# Patient Record
Sex: FEMALE | Race: WHITE | NOT HISPANIC OR LATINO | URBAN - METROPOLITAN AREA
[De-identification: names, ages, dates, MRNs, and addresses within clinical notes are randomized per-mention and may not be internally consistent; named-entity substitution may affect disease eponyms.]

---

## 2022-09-16 ENCOUNTER — APPOINTMENT (OUTPATIENT)
Dept: PREADMISSION TESTING | Facility: HOSPITAL | Age: 72
End: 2022-09-16
Attending: ORTHOPAEDIC SURGERY
Payer: MEDICARE

## 2022-09-16 ENCOUNTER — TRANSCRIBE ORDERS (OUTPATIENT)
Dept: PREADMISSION TESTING | Facility: HOSPITAL | Age: 72
End: 2022-09-16

## 2022-09-16 VITALS
TEMPERATURE: 97.7 F | WEIGHT: 144 LBS | SYSTOLIC BLOOD PRESSURE: 120 MMHG | RESPIRATION RATE: 12 BRPM | BODY MASS INDEX: 24.59 KG/M2 | HEART RATE: 64 BPM | OXYGEN SATURATION: 98 % | HEIGHT: 64 IN | DIASTOLIC BLOOD PRESSURE: 72 MMHG

## 2022-09-16 DIAGNOSIS — M19.012 PRIMARY OSTEOARTHRITIS, LEFT SHOULDER: ICD-10-CM

## 2022-09-16 DIAGNOSIS — Z01.818 ENCOUNTER FOR OTHER PREPROCEDURAL EXAMINATION: ICD-10-CM

## 2022-09-16 DIAGNOSIS — M19.012 PRIMARY OSTEOARTHRITIS, LEFT SHOULDER: Primary | ICD-10-CM

## 2022-09-16 DIAGNOSIS — Z20.822 CONTACT WITH AND (SUSPECTED) EXPOSURE TO COVID-19: ICD-10-CM

## 2022-09-16 DIAGNOSIS — Z01.818 ENCOUNTER FOR OTHER PREPROCEDURAL EXAMINATION: Primary | ICD-10-CM

## 2022-09-16 PROBLEM — Z86.2 HISTORY OF BLOOD CLOTTING DISORDER: Status: ACTIVE | Noted: 2022-09-16

## 2022-09-16 PROBLEM — Z86.73 HX OF TRANSIENT ISCHEMIC ATTACK (TIA): Status: ACTIVE | Noted: 2022-09-16

## 2022-09-16 PROBLEM — I10 HYPERTENSION: Status: ACTIVE | Noted: 2022-09-16

## 2022-09-16 LAB — SARS-COV-2 RNA RESP QL NAA+PROBE: NEGATIVE

## 2022-09-16 PROCEDURE — 99203 OFFICE O/P NEW LOW 30 MIN: CPT | Performed by: HOSPITALIST

## 2022-09-16 PROCEDURE — U0003 INFECTIOUS AGENT DETECTION BY NUCLEIC ACID (DNA OR RNA); SEVERE ACUTE RESPIRATORY SYNDROME CORONAVIRUS 2 (SARS-COV-2) (CORONAVIRUS DISEASE [COVID-19]), AMPLIFIED PROBE TECHNIQUE, MAKING USE OF HIGH THROUGHPUT TECHNOLOGIES AS DESCRIBED BY CMS-2020-01-R: HCPCS

## 2022-09-16 RX ORDER — IRBESARTAN 150 MG/1
150 TABLET ORAL NIGHTLY
COMMUNITY

## 2022-09-16 RX ORDER — PANTOPRAZOLE SODIUM 40 MG/1
40 TABLET, DELAYED RELEASE ORAL DAILY
COMMUNITY

## 2022-09-16 RX ORDER — ATORVASTATIN CALCIUM 40 MG/1
40 TABLET, FILM COATED ORAL NIGHTLY
COMMUNITY

## 2022-09-16 RX ORDER — DILTIAZEM HYDROCHLORIDE 120 MG/1
120 CAPSULE, COATED, EXTENDED RELEASE ORAL DAILY
COMMUNITY

## 2022-09-16 RX ORDER — ASPIRIN 81 MG/1
81 TABLET ORAL DAILY
Status: ON HOLD | COMMUNITY
End: 2022-09-22 | Stop reason: SDUPTHER

## 2022-09-16 RX ORDER — LORAZEPAM 0.5 MG/1
0.5 TABLET ORAL EVERY 6 HOURS PRN
COMMUNITY

## 2022-09-16 RX ORDER — ESCITALOPRAM OXALATE 20 MG/1
20 TABLET ORAL DAILY
COMMUNITY

## 2022-09-16 RX ORDER — EZETIMIBE 10 MG/1
10 TABLET ORAL NIGHTLY
COMMUNITY

## 2022-09-16 ASSESSMENT — PAIN SCALES - GENERAL: PAINLEVEL: 2

## 2022-09-16 NOTE — H&P (VIEW-ONLY)
[facility] Medicine Service -  Pre-Operative Consultation       Patient Name: Radha Chiang  Referring Surgeon: Dr. Mello Paez    Reason for Referral: Pre-Operative Evaluation  Surgical Procedure: Left Anatomic Total Shoulder Arthroplasty Versus Reverse Total Shoulder Arthroplasty  Operative Date: 09/21/2022  Other Providers:      PCP: Gemran Mi MD          HISTORY OF PRESENT ILLNESS      Radha Chiang is a 72 y.o. female presenting today to the [facility] at [facility] for pre-operative evaluation prior to planned surgery.    Complains of progressive left shoulder pain.  No relief with conservative measures and reports that her pain now constant, impacting her day-to-day activities so opting for surgery.    In regards to medical history:      The patient denies any current or recent chest pain or pressure, dyspnea, cough, sputum, fevers, chills, abdominal pain, nausea, vomiting, diarrhea or other symptoms. No urinary symptoms, no BRBPR or melena    Functionally, the patient is able to ascend a flight or so of stairs with no dyspnea or chest pain.     The patient denies, on specific questioning, the following:  No history of MI, or CHF. Hx of SVT  No history of LUZ.  No history of DVT/PE. Hx of activated protein C resistance, prothrombin gene mutation  No history of COPD.  history of CVA.  No history of DM.   No history of CKD.     PAST MEDICAL AND SURGICAL HISTORY      Past Medical History:   Diagnosis Date    Activated protein C resistance (CMS/HCC)     Anxiety     Arthritis     Back pain     COVID-19 05/31/2022    symptoms cough sore throat and fever    COVID-19 vaccine series completed     2 shots and 2 boosters    Depression     GERD (gastroesophageal reflux disease)     History of loop recorder     still in since 2021    History of Mohs surgery for squamous cell carcinoma of skin     on head    Hypertension     Lipid disorder      Mini stroke      was confused brought to ER carlkeri nothing found follwed up with neuro with no issues    Prothrombin gene mutation (CMS/HCC)     Shoulder pain     SVT (supraventricular tachycardia) (CMS/HCC)        Past Surgical History:   Procedure Laterality Date     SECTION      times 2    COLONOSCOPY      DIAGNOSTIC LAPAROSCOPY      KNEE SURGERY Left     menicus    SALIVARY GLAND SURGERY Left     cancer 2019    SHOULDER SURGERY Bilateral     right labrum    TONSILLECTOMY      UPPER GASTROINTESTINAL ENDOSCOPY      WISDOM TOOTH EXTRACTION         MEDICATIONS        Current Outpatient Medications:     aspirin 81 mg enteric coated tablet, Take 81 mg by mouth daily. Sept 15, 2022, Disp: , Rfl:     atorvastatin (LIPITOR) 40 mg tablet, Take 40 mg by mouth nightly., Disp: , Rfl:     dilTIAZem CD (CARDIZEM CD) 120 mg 24 hr capsule, Take 120 mg by mouth daily., Disp: , Rfl:     escitalopram (LEXAPRO) 20 mg tablet, Take 20 mg by mouth daily., Disp: , Rfl:     ezetimibe (ZETIA) 10 mg tablet, Take 10 mg by mouth nightly., Disp: , Rfl:     irbesartan (AVAPRO) 150 mg tablet, Take 150 mg by mouth nightly., Disp: , Rfl:     LORazepam (ATIVAN) 0.5 mg tablet, Take 0.5 mg by mouth every 6 (six) hours as needed for anxiety., Disp: , Rfl:     pantoprazole (PROTONIX) 40 mg EC tablet, Take 40 mg by mouth daily., Disp: , Rfl:     rivaroxaban (XARELTO) 20 mg tablet, Take 10 mg by mouth daily with dinner. Before flying   And for 4 weeks, Disp: , Rfl:     ALLERGIES      Cat dander, House dust, Mold, and Other    FAMILY HISTORY      family history is not on file.    Denies any prior known family history of DVTs/PEs/clotting disorder    SOCIAL HISTORY      Social History     Tobacco Use    Smoking status: Never Smoker    Smokeless tobacco: Never Used   Vaping Use    Vaping Use: Never used   Substance Use Topics    Alcohol use: Yes     Comment: social    Drug use: Yes     Types: Marijuana      "Comment: brady at times       REVIEW OF SYSTEMS      All other systems reviewed and negative except as noted in HPI    PHYSICAL EXAMINATION      Visit Vitals  /72 (BP Location: Right upper arm, Patient Position: Sitting)   Pulse 64   Temp 36.5 °C (97.7 °F)   Resp 12   Ht 1.626 m (5' 4\")   Wt 65.3 kg (144 lb)   SpO2 98%   BMI 24.72 kg/m²     Body mass index is 24.72 kg/m².    Physical Exam  Constitutional:       Appearance: Normal appearance.   HENT:      Head: Normocephalic and atraumatic.   Eyes:      Conjunctiva/sclera: Conjunctivae normal.   Cardiovascular:      Rate and Rhythm: Normal rate and regular rhythm.      Heart sounds: Normal heart sounds.   Pulmonary:      Effort: Pulmonary effort is normal.      Breath sounds: Normal breath sounds.   Abdominal:      General: Bowel sounds are normal. There is no distension.      Palpations: Abdomen is soft.      Tenderness: There is no abdominal tenderness.   Musculoskeletal:      Cervical back: Neck supple.      Comments: Left shoulder limited ROM from pain   Skin:     General: Skin is warm and dry.   Neurological:      Mental Status: She is alert and oriented to person, place, and time.   Psychiatric:         Mood and Affect: Mood normal.         Behavior: Behavior normal.         LABS / EKG        Labs  Outside labs reviewed: CBC, CMP - within acceptable limits    No results found for: NA, K, CL, BUN, CREATININE, WBC, HGB, HCT, PLT, ALT, AST, INR, HGBA1C      ECG/Telemetry  I have independently reviewed the ECG. No significant findings.    ASSESSMENT AND PLAN         Encounter for other preprocedural examination  Reports good exercise capacity  History of TGA, asymptomatic coronary atherosclerosis per cardiology records.  Currently patient exhibits no signs/symptoms of angina, arrythmia or decompenstated CHF  Seen by her cardiologist for preop cardiovascular evaluation and per Dr. Walsh \" low cardiovascular risk for surgery.\"  Preop labs within acceptable " limits.    P:  Can proceed to surgery without additional testing.    History of blood clotting disorder  No personal history of DVT/PE  Testing positive for activated protein C resistance, prothrombin gene mutation  Seen by hematology and plan for Xarelto postop for DVT prophylaxis.  Recommend early ambulation and SCDs postop.    Hypertension  Blood pressure appears to be adequately controlled  Continue diltiazem and Avapro.  Patient instructed to hold her Avapro the morning of surgery.  Monitor blood pressures postop    Hx of transient ischemic attack (TIA)  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  On aspirin, statin  Aspirin to be held for surgery       In regards to perioperative cardiac risk:  Regarding perioperative cardiovascular risk:  Defer preoperative cardiovascular risk assessment and recommendations for further testing to the patient's cardiologist.      Further comments:  Resume supplements when OK with surgical team.  I would encourage incentive spirometry to assist with minimizing anna-operative pulmonary risk.  DVT prophylaxis and timing of such per the discretion of the surgeon.     Please do not hesitate to contact [facility] during the upcoming hospitalization with any questions or concerns.     Ivan Easley MD  9/16/2022

## 2022-09-16 NOTE — ASSESSMENT & PLAN NOTE
Blood pressure appears to be adequately controlled  Continue diltiazem and Avapro.  Patient instructed to hold her Avapro the morning of surgery.  Monitor blood pressures postop

## 2022-09-16 NOTE — CONSULTS
[facility] Medicine Service -  Pre-Operative Consultation       Patient Name: Radha Chiang  Referring Surgeon: Dr. Mello Paez    Reason for Referral: Pre-Operative Evaluation  Surgical Procedure: Left Anatomic Total Shoulder Arthroplasty Versus Reverse Total Shoulder Arthroplasty  Operative Date: 09/21/2022  Other Providers:      PCP: German Mi MD          HISTORY OF PRESENT ILLNESS      Radha Chiang is a 72 y.o. female presenting today to the [facility] at [facility] for pre-operative evaluation prior to planned surgery.    Complains of progressive left shoulder pain.  No relief with conservative measures and reports that her pain now constant, impacting her day-to-day activities so opting for surgery.    In regards to medical history:      The patient denies any current or recent chest pain or pressure, dyspnea, cough, sputum, fevers, chills, abdominal pain, nausea, vomiting, diarrhea or other symptoms. No urinary symptoms, no BRBPR or melena    Functionally, the patient is able to ascend a flight or so of stairs with no dyspnea or chest pain.     The patient denies, on specific questioning, the following:  No history of MI, or CHF. Hx of SVT  No history of LUZ.  No history of DVT/PE. Hx of activated protein C resistance, prothrombin gene mutation  No history of COPD.  history of CVA.  No history of DM.   No history of CKD.     PAST MEDICAL AND SURGICAL HISTORY      Past Medical History:   Diagnosis Date    Activated protein C resistance (CMS/HCC)     Anxiety     Arthritis     Back pain     COVID-19 05/31/2022    symptoms cough sore throat and fever    COVID-19 vaccine series completed     2 shots and 2 boosters    Depression     GERD (gastroesophageal reflux disease)     History of loop recorder     still in since 2021    History of Mohs surgery for squamous cell carcinoma of skin     on head    Hypertension     Lipid disorder      Mini stroke      was confused brought to ER carlkeri nothing found follwed up with neuro with no issues    Prothrombin gene mutation (CMS/HCC)     Shoulder pain     SVT (supraventricular tachycardia) (CMS/HCC)        Past Surgical History:   Procedure Laterality Date     SECTION      times 2    COLONOSCOPY      DIAGNOSTIC LAPAROSCOPY      KNEE SURGERY Left     menicus    SALIVARY GLAND SURGERY Left     cancer 2019    SHOULDER SURGERY Bilateral     right labrum    TONSILLECTOMY      UPPER GASTROINTESTINAL ENDOSCOPY      WISDOM TOOTH EXTRACTION         MEDICATIONS        Current Outpatient Medications:     aspirin 81 mg enteric coated tablet, Take 81 mg by mouth daily. Sept 15, 2022, Disp: , Rfl:     atorvastatin (LIPITOR) 40 mg tablet, Take 40 mg by mouth nightly., Disp: , Rfl:     dilTIAZem CD (CARDIZEM CD) 120 mg 24 hr capsule, Take 120 mg by mouth daily., Disp: , Rfl:     escitalopram (LEXAPRO) 20 mg tablet, Take 20 mg by mouth daily., Disp: , Rfl:     ezetimibe (ZETIA) 10 mg tablet, Take 10 mg by mouth nightly., Disp: , Rfl:     irbesartan (AVAPRO) 150 mg tablet, Take 150 mg by mouth nightly., Disp: , Rfl:     LORazepam (ATIVAN) 0.5 mg tablet, Take 0.5 mg by mouth every 6 (six) hours as needed for anxiety., Disp: , Rfl:     pantoprazole (PROTONIX) 40 mg EC tablet, Take 40 mg by mouth daily., Disp: , Rfl:     rivaroxaban (XARELTO) 20 mg tablet, Take 10 mg by mouth daily with dinner. Before flying   And for 4 weeks, Disp: , Rfl:     ALLERGIES      Cat dander, House dust, Mold, and Other    FAMILY HISTORY      family history is not on file.    Denies any prior known family history of DVTs/PEs/clotting disorder    SOCIAL HISTORY      Social History     Tobacco Use    Smoking status: Never Smoker    Smokeless tobacco: Never Used   Vaping Use    Vaping Use: Never used   Substance Use Topics    Alcohol use: Yes     Comment: social    Drug use: Yes     Types: Marijuana      "Comment: brady at times       REVIEW OF SYSTEMS      All other systems reviewed and negative except as noted in HPI    PHYSICAL EXAMINATION      Visit Vitals  /72 (BP Location: Right upper arm, Patient Position: Sitting)   Pulse 64   Temp 36.5 °C (97.7 °F)   Resp 12   Ht 1.626 m (5' 4\")   Wt 65.3 kg (144 lb)   SpO2 98%   BMI 24.72 kg/m²     Body mass index is 24.72 kg/m².    Physical Exam  Constitutional:       Appearance: Normal appearance.   HENT:      Head: Normocephalic and atraumatic.   Eyes:      Conjunctiva/sclera: Conjunctivae normal.   Cardiovascular:      Rate and Rhythm: Normal rate and regular rhythm.      Heart sounds: Normal heart sounds.   Pulmonary:      Effort: Pulmonary effort is normal.      Breath sounds: Normal breath sounds.   Abdominal:      General: Bowel sounds are normal. There is no distension.      Palpations: Abdomen is soft.      Tenderness: There is no abdominal tenderness.   Musculoskeletal:      Cervical back: Neck supple.      Comments: Left shoulder limited ROM from pain   Skin:     General: Skin is warm and dry.   Neurological:      Mental Status: She is alert and oriented to person, place, and time.   Psychiatric:         Mood and Affect: Mood normal.         Behavior: Behavior normal.         LABS / EKG        Labs  Outside labs reviewed: CBC, CMP - within acceptable limits    No results found for: NA, K, CL, BUN, CREATININE, WBC, HGB, HCT, PLT, ALT, AST, INR, HGBA1C      ECG/Telemetry  I have independently reviewed the ECG. No significant findings.    ASSESSMENT AND PLAN         Encounter for other preprocedural examination  Reports good exercise capacity  History of TGA, asymptomatic coronary atherosclerosis per cardiology records.  Currently patient exhibits no signs/symptoms of angina, arrythmia or decompenstated CHF  Seen by her cardiologist for preop cardiovascular evaluation and per Dr. Walsh \" low cardiovascular risk for surgery.\"  Preop labs within acceptable " limits.    P:  Can proceed to surgery without additional testing.    History of blood clotting disorder  No personal history of DVT/PE  Testing positive for activated protein C resistance, prothrombin gene mutation  Seen by hematology and plan for Xarelto postop for DVT prophylaxis.  Recommend early ambulation and SCDs postop.    Hypertension  Blood pressure appears to be adequately controlled  Continue diltiazem and Avapro.  Patient instructed to hold her Avapro the morning of surgery.  Monitor blood pressures postop    Hx of transient ischemic attack (TIA)  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  On aspirin, statin  Aspirin to be held for surgery       In regards to perioperative cardiac risk:  Regarding perioperative cardiovascular risk:  Defer preoperative cardiovascular risk assessment and recommendations for further testing to the patient's cardiologist.      Further comments:  Resume supplements when OK with surgical team.  I would encourage incentive spirometry to assist with minimizing anna-operative pulmonary risk.  DVT prophylaxis and timing of such per the discretion of the surgeon.     Please do not hesitate to contact [facility] during the upcoming hospitalization with any questions or concerns.     Ivan Easley MD  9/16/2022

## 2022-09-16 NOTE — ASSESSMENT & PLAN NOTE
"Reports good exercise capacity  History of TGA, asymptomatic coronary atherosclerosis per cardiology records.  Currently patient exhibits no signs/symptoms of angina, arrythmia or decompenstated CHF  Seen by her cardiologist for preop cardiovascular evaluation and per Dr. Walsh \" low cardiovascular risk for surgery.\"  Preop labs within acceptable limits.    P:  Can proceed to surgery without additional testing.  "

## 2022-09-16 NOTE — PRE-PROCEDURE INSTRUCTIONS
1. Admissions will call you with your arrival time on  9/20/22 (day prior to surgery) between 2pm-4pm.  For questions about your arrival time, please call [phone].     2. On the day of your procedure please report to the [facility] in the [place]. Please arrive through the [facility] Entrance.  If you are parking in the [facility], come to the ground floor of the garage and follow signs to the [facility].  After being screened, please report to either the Outpatient Registration for Pre-Admission Testing or to the Surgery Registration Desk.Bring insurance card and photo ID     3. Please follow the following fasting guidelines:     No solid food EIGHT HOURS prior to arrival   Unlimited clear liquids, meaning water or PLAIN black coffee WITHOUT any milk, cream, sugar, or sweetener are permitted up to TWO HOURS prior to arrival at the hospital.    4. Early on the morning of the procedure please take your usual dose of the listed medications with a sip of water Diltiazem and Lexapro and Protonix       5. Other Instructions: You may brush your teeth the morning of the procedure. Rinse and spit, do not swallow.  Bring a list of your medications with dosages.  Use surgical wash as directed.     6. If you develop a cold, cough, fever, rash, or other symptom prior to the day of the procedure, please report it to your physician immediately.    7. If you need to cancel the procedure for any reason, please contact your physician.    8. Make arrangements to have someone drive you home from the procedure. If you have not arranged for transportation home, your surgery may be cancelled.     9. You may not take public transportation unless accompanied by a responsible person.    10. You may not drive a car or operate complex or potentially dangerous machinery for 24 hours following anesthesia and/or sedation.    11.  If it is medically necessary for you to have a longer stay, you will be  informed as soon as the decision is made.    12. Only bring essential items to the hospital.  Do not wear or bring anything of value to the hospital including jewelry of any kind, money, or wallet. Do not wear make-up or contact lenses.  DO NOT BRING MEDICATIONS FROM HOME unless instructed to do so. DO bring your hearing aids, glasses, and a case    13. No lotion, creams, powders, or oils on skin the morning of procedure     14. Dress in comfortable clothes.    15.  If instructed, please bring a copy of your Advanced Directive (Living Will/Durable Power of ) on the day of your procedure.     16. Patients need to quarantine from the time of PAT COVID test to day of surgery, regardless of COVID vaccine status.      17. Ensuring your safety at all times is a very important part of our [facility] Culture of Safety. After having surgery and sedation, you are at risk for falling and balance issues. Although you may feel awake, the effects of the medication can last up to 24 hours after anesthesia. If you need to use the bathroom during your recovery period, nursing staff will escort you there and stay with you to ensure your safety.    18. Refrain from drinking alcohol and smoking cigarettes for 24 hours prior to surgery.    19. Shower with antibacterial soap (Dial) the night before and morning of your procedure.  If your procedure indicates the need for CHG antiseptic wash (Bactoshield or Hibiclens), please use this instead and follow instructions as discussed at the time of your Pre-Admission Testing phone interview or visit.    Follow Dr Paez Instructions for showers starting 2 days prior and morning of surgery     Above instructions reviewed with patient and patient acknowledges understanding.    Form explained by: Maru De La Rosa RN     Covid visitor policy reviewed

## 2022-09-16 NOTE — ASSESSMENT & PLAN NOTE
No personal history of DVT/PE  Testing positive for activated protein C resistance, prothrombin gene mutation  Seen by hematology and plan for Xarelto postop for DVT prophylaxis.  Recommend early ambulation and SCDs postop.

## 2022-09-20 ENCOUNTER — ANESTHESIA EVENT (OUTPATIENT)
Dept: OPERATING ROOM | Facility: HOSPITAL | Age: 72
Setting detail: SURGERY ADMIT
DRG: 483 | End: 2022-09-20
Payer: MEDICARE

## 2022-09-20 ASSESSMENT — ENCOUNTER SYMPTOMS: DEPRESSION: 1

## 2022-09-20 NOTE — ANESTHESIA PREPROCEDURE EVALUATION
Relevant Problems   CARDIOVASCULAR   (+) Hypertension      NEUROLOGY   (+) History of blood clotting disorder   (+) Hx of transient ischemic attack (TIA)       Anesthesia ROS/MED HX    Anesthesia History - neg  Pulmonary - neg  Neuro/Psych    TIA   Depression  Cardiovascular   dyslipidemia   hypertension   Cardiac clearance reviewed  Hematological    coagulopathy  GI/Hepatic   GERD  Musculoskeletal   Osteoarthritis  Renal Disease- neg  Endo/Other- neg  Body Habitus: Normal  ROS/MED HX Comments:    Neurology/Psychology: TIA 2-3 yrs ago   ROS/Hx: Only takes xeralto when she flies    Denies other med issues       Past Surgical History:   Procedure Laterality Date     SECTION      times 2    COLONOSCOPY      DIAGNOSTIC LAPAROSCOPY      KNEE SURGERY Left     menicus    SALIVARY GLAND SURGERY Left     cancer 2019    SHOULDER SURGERY Bilateral     right labrum    TONSILLECTOMY      UPPER GASTROINTESTINAL ENDOSCOPY      WISDOM TOOTH EXTRACTION         Physical Exam    Airway   Mallampati: II   TM distance: >3 FB   Neck ROM: full  Cardiovascular - normal   Rhythm: regular   Rate: normalPulmonary - normal   clear to auscultation  Dental - normal        Anesthesia Plan    Plan: regional and general    Technique: general endotracheal and nerve block     Airway: oral intubation   ASA 2  Blood Products:     Use of Blood Products Discussed: Yes     Consented to blood products  Anesthetic plan and risks discussed with: patient and spouse  Induction:    intravenous   Postop Plan:   Patient Disposition: phase II then home

## 2022-09-21 ENCOUNTER — HOSPITAL ENCOUNTER (INPATIENT)
Facility: HOSPITAL | Age: 72
LOS: 1 days | Discharge: HOME | DRG: 483 | End: 2022-09-22
Attending: ORTHOPAEDIC SURGERY | Admitting: ORTHOPAEDIC SURGERY
Payer: MEDICARE

## 2022-09-21 ENCOUNTER — APPOINTMENT (OUTPATIENT)
Dept: RADIOLOGY | Facility: HOSPITAL | Age: 72
Setting detail: SURGERY ADMIT
DRG: 483 | End: 2022-09-21
Attending: STUDENT IN AN ORGANIZED HEALTH CARE EDUCATION/TRAINING PROGRAM
Payer: MEDICARE

## 2022-09-21 ENCOUNTER — ANESTHESIA (OUTPATIENT)
Dept: OPERATING ROOM | Facility: HOSPITAL | Age: 72
Setting detail: SURGERY ADMIT
DRG: 483 | End: 2022-09-21
Payer: MEDICARE

## 2022-09-21 PROBLEM — Z96.612 S/P SHOULDER REPLACEMENT, LEFT: Status: ACTIVE | Noted: 2022-09-21

## 2022-09-21 PROBLEM — Z98.890 S/P SHOULDER SURGERY: Status: ACTIVE | Noted: 2022-09-16

## 2022-09-21 LAB
GLUCOSE BLD-MCNC: 103 MG/DL (ref 70–99)
GLUCOSE BLD-MCNC: 144 MG/DL (ref 70–99)
POCT TEST: ABNORMAL
POCT TEST: ABNORMAL

## 2022-09-21 PROCEDURE — 36000015 HC OR LEVEL 5 EA ADDL MIN: Performed by: ORTHOPAEDIC SURGERY

## 2022-09-21 PROCEDURE — 25800000 HC PHARMACY IV SOLUTIONS: Performed by: NURSE PRACTITIONER

## 2022-09-21 PROCEDURE — 97166 OT EVAL MOD COMPLEX 45 MIN: CPT | Mod: GO

## 2022-09-21 PROCEDURE — 37000001 HC ANESTHESIA GENERAL: Performed by: ORTHOPAEDIC SURGERY

## 2022-09-21 PROCEDURE — 0RPK04Z REMOVAL OF INTERNAL FIXATION DEVICE FROM LEFT SHOULDER JOINT, OPEN APPROACH: ICD-10-PCS | Performed by: ORTHOPAEDIC SURGERY

## 2022-09-21 PROCEDURE — 63700000 HC SELF-ADMINISTRABLE DRUG: Performed by: NURSE PRACTITIONER

## 2022-09-21 PROCEDURE — 21400000 HC ROOM AND CARE CCU/INTERMEDIATE

## 2022-09-21 PROCEDURE — 0LS40ZZ REPOSITION LEFT UPPER ARM TENDON, OPEN APPROACH: ICD-10-PCS | Performed by: ORTHOPAEDIC SURGERY

## 2022-09-21 PROCEDURE — 71000001 HC PACU PHASE 1 INITIAL 30MIN: Performed by: ORTHOPAEDIC SURGERY

## 2022-09-21 PROCEDURE — 73020 X-RAY EXAM OF SHOULDER: CPT | Mod: LT

## 2022-09-21 PROCEDURE — 0RRK0JZ REPLACEMENT OF LEFT SHOULDER JOINT WITH SYNTHETIC SUBSTITUTE, OPEN APPROACH: ICD-10-PCS | Performed by: ORTHOPAEDIC SURGERY

## 2022-09-21 PROCEDURE — 63600000 HC DRUGS/DETAIL CODE: Performed by: NURSE PRACTITIONER

## 2022-09-21 PROCEDURE — 71000011 HC PACU PHASE 1 EA ADDL MIN: Performed by: ORTHOPAEDIC SURGERY

## 2022-09-21 PROCEDURE — 63600000 HC DRUGS/DETAIL CODE: Performed by: NURSE ANESTHETIST, CERTIFIED REGISTERED

## 2022-09-21 PROCEDURE — 25800000 HC PHARMACY IV SOLUTIONS: Performed by: ORTHOPAEDIC SURGERY

## 2022-09-21 PROCEDURE — C1713 ANCHOR/SCREW BN/BN,TIS/BN: HCPCS | Performed by: ORTHOPAEDIC SURGERY

## 2022-09-21 PROCEDURE — 25000000 HC PHARMACY GENERAL: Performed by: NURSE ANESTHETIST, CERTIFIED REGISTERED

## 2022-09-21 PROCEDURE — 63600000 HC DRUGS/DETAIL CODE: Performed by: ORTHOPAEDIC SURGERY

## 2022-09-21 PROCEDURE — 97161 PT EVAL LOW COMPLEX 20 MIN: CPT | Mod: GP

## 2022-09-21 PROCEDURE — 27200000 HC STERILE SUPPLY: Performed by: ORTHOPAEDIC SURGERY

## 2022-09-21 PROCEDURE — 97535 SELF CARE MNGMENT TRAINING: CPT | Mod: GO

## 2022-09-21 PROCEDURE — 36000005 HC OR LEVEL 5 INITIAL 30MIN: Performed by: ORTHOPAEDIC SURGERY

## 2022-09-21 PROCEDURE — C1776 JOINT DEVICE (IMPLANTABLE): HCPCS | Performed by: ORTHOPAEDIC SURGERY

## 2022-09-21 PROCEDURE — 99232 SBSQ HOSP IP/OBS MODERATE 35: CPT | Performed by: HOSPITALIST

## 2022-09-21 DEVICE — CEMENT BONE SIMPLEX W/TOBRAMYCIN: Type: IMPLANTABLE DEVICE | Site: SHOULDER | Status: FUNCTIONAL

## 2022-09-21 DEVICE — GLENOID ALL-POLY PEGGED 46MM: Type: IMPLANTABLE DEVICE | Site: SHOULDER | Status: FUNCTIONAL

## 2022-09-21 DEVICE — KIT STEM: Type: IMPLANTABLE DEVICE | Site: SHOULDER | Status: FUNCTIONAL

## 2022-09-21 DEVICE — HEAD HUMERAL 46MM X 18MM: Type: IMPLANTABLE DEVICE | Site: SHOULDER | Status: FUNCTIONAL

## 2022-09-21 RX ORDER — ROCURONIUM BROMIDE 10 MG/ML
INJECTION, SOLUTION INTRAVENOUS AS NEEDED
Status: DISCONTINUED | OUTPATIENT
Start: 2022-09-21 | End: 2022-09-21 | Stop reason: SURG

## 2022-09-21 RX ORDER — PHENYLEPHRINE HCL IN 0.9% NACL 50MG/250ML
10-200 PLASTIC BAG, INJECTION (ML) INTRAVENOUS ONCE
Status: COMPLETED | OUTPATIENT
Start: 2022-09-21 | End: 2022-09-21

## 2022-09-21 RX ORDER — LORAZEPAM 0.5 MG/1
0.5 TABLET ORAL EVERY 6 HOURS PRN
Status: DISCONTINUED | OUTPATIENT
Start: 2022-09-21 | End: 2022-09-22 | Stop reason: HOSPADM

## 2022-09-21 RX ORDER — ACETAMINOPHEN 325 MG/1
650 TABLET ORAL EVERY 6 HOURS
Status: DISCONTINUED | OUTPATIENT
Start: 2022-09-21 | End: 2022-09-22 | Stop reason: HOSPADM

## 2022-09-21 RX ORDER — PANTOPRAZOLE SODIUM 40 MG/1
40 TABLET, DELAYED RELEASE ORAL DAILY
Status: DISCONTINUED | OUTPATIENT
Start: 2022-09-22 | End: 2022-09-22 | Stop reason: HOSPADM

## 2022-09-21 RX ORDER — DILTIAZEM HYDROCHLORIDE 120 MG/1
120 CAPSULE, COATED, EXTENDED RELEASE ORAL DAILY
Status: DISCONTINUED | OUTPATIENT
Start: 2022-09-22 | End: 2022-09-22 | Stop reason: HOSPADM

## 2022-09-21 RX ORDER — LOSARTAN POTASSIUM 50 MG/1
50 TABLET ORAL DAILY
Status: DISCONTINUED | OUTPATIENT
Start: 2022-09-21 | End: 2022-09-22 | Stop reason: HOSPADM

## 2022-09-21 RX ORDER — SODIUM CHLORIDE 9 MG/ML
INJECTION, SOLUTION INTRAVENOUS CONTINUOUS
Status: ACTIVE | OUTPATIENT
Start: 2022-09-21 | End: 2022-09-22

## 2022-09-21 RX ORDER — LIDOCAINE HYDROCHLORIDE 10 MG/ML
INJECTION, SOLUTION INFILTRATION; PERINEURAL AS NEEDED
Status: DISCONTINUED | OUTPATIENT
Start: 2022-09-21 | End: 2022-09-21 | Stop reason: SURG

## 2022-09-21 RX ORDER — DEXTROSE 50 % IN WATER (D50W) INTRAVENOUS SYRINGE
25 AS NEEDED
Status: DISCONTINUED | OUTPATIENT
Start: 2022-09-21 | End: 2022-09-22 | Stop reason: HOSPADM

## 2022-09-21 RX ORDER — HYDROMORPHONE HYDROCHLORIDE 1 MG/ML
0.5 INJECTION, SOLUTION INTRAMUSCULAR; INTRAVENOUS; SUBCUTANEOUS
Status: DISCONTINUED | OUTPATIENT
Start: 2022-09-21 | End: 2022-09-21 | Stop reason: HOSPADM

## 2022-09-21 RX ORDER — IBUPROFEN 200 MG
16-32 TABLET ORAL AS NEEDED
Status: DISCONTINUED | OUTPATIENT
Start: 2022-09-21 | End: 2022-09-21 | Stop reason: HOSPADM

## 2022-09-21 RX ORDER — EZETIMIBE 10 MG/1
10 TABLET ORAL NIGHTLY
Status: DISCONTINUED | OUTPATIENT
Start: 2022-09-21 | End: 2022-09-22 | Stop reason: HOSPADM

## 2022-09-21 RX ORDER — ASPIRIN 81 MG/1
81 TABLET ORAL DAILY
Status: DISCONTINUED | OUTPATIENT
Start: 2022-09-22 | End: 2022-09-21

## 2022-09-21 RX ORDER — ALUMINUM HYDROXIDE, MAGNESIUM HYDROXIDE, AND SIMETHICONE 1200; 120; 1200 MG/30ML; MG/30ML; MG/30ML
30 SUSPENSION ORAL EVERY 4 HOURS PRN
Status: DISCONTINUED | OUTPATIENT
Start: 2022-09-21 | End: 2022-09-22 | Stop reason: HOSPADM

## 2022-09-21 RX ORDER — DEXTROSE 40 %
15-30 GEL (GRAM) ORAL AS NEEDED
Status: DISCONTINUED | OUTPATIENT
Start: 2022-09-21 | End: 2022-09-21 | Stop reason: HOSPADM

## 2022-09-21 RX ORDER — SODIUM CHLORIDE 9 MG/ML
INJECTION, SOLUTION INTRAVENOUS CONTINUOUS
Status: DISCONTINUED | OUTPATIENT
Start: 2022-09-21 | End: 2022-09-22 | Stop reason: HOSPADM

## 2022-09-21 RX ORDER — PROPOFOL 10 MG/ML
INJECTION, EMULSION INTRAVENOUS AS NEEDED
Status: DISCONTINUED | OUTPATIENT
Start: 2022-09-21 | End: 2022-09-21 | Stop reason: SURG

## 2022-09-21 RX ORDER — DEXTROSE 50 % IN WATER (D50W) INTRAVENOUS SYRINGE
25 AS NEEDED
Status: DISCONTINUED | OUTPATIENT
Start: 2022-09-21 | End: 2022-09-21 | Stop reason: HOSPADM

## 2022-09-21 RX ORDER — FENTANYL CITRATE 50 UG/ML
INJECTION, SOLUTION INTRAMUSCULAR; INTRAVENOUS AS NEEDED
Status: DISCONTINUED | OUTPATIENT
Start: 2022-09-21 | End: 2022-09-21 | Stop reason: SURG

## 2022-09-21 RX ORDER — DEXTROSE 40 %
15-30 GEL (GRAM) ORAL AS NEEDED
Status: DISCONTINUED | OUTPATIENT
Start: 2022-09-21 | End: 2022-09-22 | Stop reason: HOSPADM

## 2022-09-21 RX ORDER — BISACODYL 10 MG/1
10 SUPPOSITORY RECTAL DAILY PRN
Status: DISCONTINUED | OUTPATIENT
Start: 2022-09-21 | End: 2022-09-22 | Stop reason: HOSPADM

## 2022-09-21 RX ORDER — ATORVASTATIN CALCIUM 40 MG/1
40 TABLET, FILM COATED ORAL NIGHTLY
Status: DISCONTINUED | OUTPATIENT
Start: 2022-09-21 | End: 2022-09-22 | Stop reason: HOSPADM

## 2022-09-21 RX ORDER — HYDROMORPHONE HYDROCHLORIDE 1 MG/ML
INJECTION, SOLUTION INTRAMUSCULAR; INTRAVENOUS; SUBCUTANEOUS AS NEEDED
Status: DISCONTINUED | OUTPATIENT
Start: 2022-09-21 | End: 2022-09-21 | Stop reason: SURG

## 2022-09-21 RX ORDER — OXYCODONE HYDROCHLORIDE 5 MG/1
5-10 TABLET ORAL EVERY 4 HOURS PRN
Status: DISCONTINUED | OUTPATIENT
Start: 2022-09-21 | End: 2022-09-22 | Stop reason: HOSPADM

## 2022-09-21 RX ORDER — ONDANSETRON HYDROCHLORIDE 2 MG/ML
4 INJECTION, SOLUTION INTRAVENOUS EVERY 8 HOURS PRN
Status: DISCONTINUED | OUTPATIENT
Start: 2022-09-21 | End: 2022-09-22 | Stop reason: HOSPADM

## 2022-09-21 RX ORDER — POLYETHYLENE GLYCOL 3350 17 G/17G
17 POWDER, FOR SOLUTION ORAL DAILY
Status: DISCONTINUED | OUTPATIENT
Start: 2022-09-21 | End: 2022-09-22 | Stop reason: HOSPADM

## 2022-09-21 RX ORDER — DEXAMETHASONE SODIUM PHOSPHATE 4 MG/ML
INJECTION, SOLUTION INTRA-ARTICULAR; INTRALESIONAL; INTRAMUSCULAR; INTRAVENOUS; SOFT TISSUE AS NEEDED
Status: DISCONTINUED | OUTPATIENT
Start: 2022-09-21 | End: 2022-09-21 | Stop reason: SURG

## 2022-09-21 RX ORDER — PHENYLEPHRINE HYDROCHLORIDE 10 MG/ML
INJECTION INTRAVENOUS AS NEEDED
Status: DISCONTINUED | OUTPATIENT
Start: 2022-09-21 | End: 2022-09-21 | Stop reason: SURG

## 2022-09-21 RX ORDER — ESCITALOPRAM OXALATE 20 MG/1
20 TABLET ORAL DAILY
Status: DISCONTINUED | OUTPATIENT
Start: 2022-09-22 | End: 2022-09-22 | Stop reason: HOSPADM

## 2022-09-21 RX ORDER — AMOXICILLIN 250 MG
1 CAPSULE ORAL 2 TIMES DAILY
Status: DISCONTINUED | OUTPATIENT
Start: 2022-09-21 | End: 2022-09-22 | Stop reason: HOSPADM

## 2022-09-21 RX ORDER — FENTANYL CITRATE 50 UG/ML
25 INJECTION, SOLUTION INTRAMUSCULAR; INTRAVENOUS
Status: DISCONTINUED | OUTPATIENT
Start: 2022-09-21 | End: 2022-09-21 | Stop reason: HOSPADM

## 2022-09-21 RX ORDER — IBUPROFEN 200 MG
16-32 TABLET ORAL AS NEEDED
Status: DISCONTINUED | OUTPATIENT
Start: 2022-09-21 | End: 2022-09-22 | Stop reason: HOSPADM

## 2022-09-21 RX ORDER — ONDANSETRON HYDROCHLORIDE 2 MG/ML
4 INJECTION, SOLUTION INTRAVENOUS
Status: DISCONTINUED | OUTPATIENT
Start: 2022-09-21 | End: 2022-09-21 | Stop reason: HOSPADM

## 2022-09-21 RX ADMIN — HYDROMORPHONE HYDROCHLORIDE 0.5 MG: 1 INJECTION, SOLUTION INTRAMUSCULAR; INTRAVENOUS; SUBCUTANEOUS at 11:00

## 2022-09-21 RX ADMIN — SODIUM CHLORIDE: 0.9 INJECTION, SOLUTION INTRAVENOUS at 07:58

## 2022-09-21 RX ADMIN — OXYCODONE HYDROCHLORIDE 5 MG: 5 TABLET ORAL at 21:49

## 2022-09-21 RX ADMIN — DEXAMETHASONE SODIUM PHOSPHATE 4 MG: 4 INJECTION, SOLUTION INTRA-ARTICULAR; INTRALESIONAL; INTRAMUSCULAR; INTRAVENOUS; SOFT TISSUE at 08:34

## 2022-09-21 RX ADMIN — CEFAZOLIN 2 G: 10 INJECTION, POWDER, FOR SOLUTION INTRAVENOUS at 08:24

## 2022-09-21 RX ADMIN — SENNOSIDES AND DOCUSATE SODIUM 1 TABLET: 50; 8.6 TABLET ORAL at 20:41

## 2022-09-21 RX ADMIN — OXYCODONE HYDROCHLORIDE 5 MG: 5 TABLET ORAL at 22:14

## 2022-09-21 RX ADMIN — LIDOCAINE HYDROCHLORIDE 5 ML: 10 INJECTION, SOLUTION INFILTRATION; PERINEURAL at 08:14

## 2022-09-21 RX ADMIN — PROPOFOL 150 MG: 10 INJECTION, EMULSION INTRAVENOUS at 08:14

## 2022-09-21 RX ADMIN — ACETAMINOPHEN 650 MG: 325 TABLET, FILM COATED ORAL at 17:18

## 2022-09-21 RX ADMIN — EZETIMIBE 10 MG: 10 TABLET ORAL at 21:48

## 2022-09-21 RX ADMIN — CEFAZOLIN 2 G: 2 INJECTION, POWDER, FOR SOLUTION INTRAMUSCULAR; INTRAVENOUS at 14:51

## 2022-09-21 RX ADMIN — LOSARTAN POTASSIUM 50 MG: 50 TABLET, FILM COATED ORAL at 21:49

## 2022-09-21 RX ADMIN — SODIUM CHLORIDE: 9 INJECTION, SOLUTION INTRAVENOUS at 13:46

## 2022-09-21 RX ADMIN — ATORVASTATIN CALCIUM 40 MG: 40 TABLET, FILM COATED ORAL at 21:48

## 2022-09-21 RX ADMIN — ACETAMINOPHEN 650 MG: 325 TABLET, FILM COATED ORAL at 23:40

## 2022-09-21 RX ADMIN — SUGAMMADEX 200 MG: 100 INJECTION, SOLUTION INTRAVENOUS at 11:10

## 2022-09-21 RX ADMIN — FENTANYL CITRATE 100 MCG: 50 INJECTION, SOLUTION INTRAMUSCULAR; INTRAVENOUS at 07:59

## 2022-09-21 RX ADMIN — ROCURONIUM BROMIDE 50 MG: 10 INJECTION, SOLUTION INTRAVENOUS at 08:15

## 2022-09-21 RX ADMIN — PHENYLEPHRINE HYDROCHLORIDE 25 MCG/MIN: 10 INJECTION INTRAVENOUS at 08:41

## 2022-09-21 RX ADMIN — POLYETHYLENE GLYCOL 3350 17 G: 17 POWDER, FOR SOLUTION ORAL at 14:36

## 2022-09-21 RX ADMIN — PHENYLEPHRINE HYDROCHLORIDE 100 MCG: 10 INJECTION INTRAVENOUS at 08:33

## 2022-09-21 RX ADMIN — CEFAZOLIN 2 G: 2 INJECTION, POWDER, FOR SOLUTION INTRAMUSCULAR; INTRAVENOUS at 23:41

## 2022-09-21 ASSESSMENT — COGNITIVE AND FUNCTIONAL STATUS - GENERAL
WALKING IN HOSPITAL ROOM: 4 - NONE
DRESSING REGULAR UPPER BODY CLOTHING: 2 - A LOT
EATING MEALS: 3 - A LITTLE
HELP NEEDED FOR PERSONAL GROOMING: 4 - NONE
STANDING UP FROM CHAIR USING ARMS: 4 - NONE
DRESSING REGULAR LOWER BODY CLOTHING: 3 - A LITTLE
STANDING UP FROM CHAIR USING ARMS: 4 - NONE
AFFECT: WFL
MOVING TO AND FROM BED TO CHAIR: 4 - NONE
DRESSING REGULAR LOWER BODY CLOTHING: 3 - A LITTLE
DRESSING REGULAR UPPER BODY CLOTHING: 3 - A LITTLE
CLIMB 3 TO 5 STEPS WITH RAILING: 4 - NONE
TOILETING: 3 - A LITTLE
WALKING IN HOSPITAL ROOM: 4 - NONE
EATING MEALS: 4 - NONE
MOVING TO AND FROM BED TO CHAIR: 4 - NONE
AFFECT: WFL
HELP NEEDED FOR BATHING: 3 - A LITTLE
HELP NEEDED FOR PERSONAL GROOMING: 3 - A LITTLE
HELP NEEDED FOR BATHING: 3 - A LITTLE
TOILETING: 3 - A LITTLE
CLIMB 3 TO 5 STEPS WITH RAILING: 4 - NONE

## 2022-09-21 ASSESSMENT — PATIENT HEALTH QUESTIONNAIRE - PHQ9: SUM OF ALL RESPONSES TO PHQ9 QUESTIONS 1 & 2: 0

## 2022-09-21 NOTE — ANESTHESIA PROCEDURE NOTES
Airway  Urgency: elective    Start Time: 9/21/2022 8:17 AM    General Information and Staff    Patient location during procedure: OR  Anesthesiologist: Liang Tobar MD  Resident/CRNA: Danika Foster CRNA  Performed: resident/CRNA     Indications and Patient Condition  Indications for airway management: anesthesia  Sedation level: general  Preoxygenated: yes  Patient position: sniffing  Mask difficulty assessment: 1 - vent by mask    Final Airway Details  Final airway type: endotracheal airway      Successful airway: ETT  Cuffed: yes   Successful intubation technique: video laryngoscopy  Facilitating devices/methods: intubating stylet  Endotracheal tube insertion site: oral  Blade: Angelo  Blade size: #3  ETT size (mm): 7.0  Cormack-Lehane Classification: grade I - full view of glottis  Placement verified by: chest auscultation and capnometry   Measured from: lips  ETT to lips (cm): 22  Number of attempts at approach: 1  Number of other approaches attempted: 0  Atraumatic airway insertion

## 2022-09-21 NOTE — PROGRESS NOTES
Orthopaedic Surgery Progress Note    Interval History:  Patient is resting comfortably in bed. Pain currently well controlled. Patient responds to questions appropriately and follows commands.    Vital Signs:   Vitals:    09/21/22 1714   BP: 110/62   Pulse: 76   Resp: 18   Temp: 36.4 °C (97.6 °F)   SpO2: 96%       Physical Exam:   General:   No acute distress   Symmetric chest rise bilaterally with normal effort     Musculoskeletal:   Left Upper Extremity  Inspection: Surgical incisions without erythema or drainage. Appropriate tenderness around the incision.  Motor: Fires WF/WE/FF/HI  Sensory: SILT in axillary/median/radial/ulnar nerve distributions   Vascular: Palpable radial pulse. Brisk capillary refill   Compartments soft and compressible     Assessment and Plan   Radha Chiang is a 72 y.o. female who is status post left aTSA    -- POD: Day of Surgery   -- WBS: NWB  -- ROM: 140 forward elevation, 40 external rotation  -- Brace: Sling in place  -- Analgesia   -- DVT PPX: xarelto   -- Abx: ancef periop  -- PT OT recs appreciated   -- [facility] recs appreciated    aNs Diaz MD   Orthopaedic Surgery PGY-1

## 2022-09-21 NOTE — PROGRESS NOTES
Patient: Radha Chiang  Location:  [facility] 5PAV 5402  MRN:  [id number]  Today's date:  9/21/2022     Pt left in bed w/ OT for further assessment, shld ROM exercises & final set up. Final set of vitals taken by OT.  PT cleared by RN to see today.    Radha is a 72 y.o. female admitted on 9/21/2022 with Arthritis of left shoulder region [M19.012]  S/P shoulder replacement, left [Z96.612]. Principal problem is S/P shoulder surgery.    Past Medical History  Radha has a past medical history of Activated protein C resistance (CMS/HCC), Anxiety, Arthritis, Back pain, COVID-19 (05/31/2022), COVID-19 vaccine series completed, Depression, GERD (gastroesophageal reflux disease), History of loop recorder, History of Mohs surgery for squamous cell carcinoma of skin, Hypertension, Lipid disorder, Mini stroke, Prothrombin gene mutation (CMS/HCC), Shoulder pain, and SVT (supraventricular tachycardia) (CMS/HCC).    History of Present Illness   Pt is a 73 y/o female s/p L aTSA on 9/21/22  (Philippe).      PT Vitals    Date/Time Pulse HR Source Resp SpO2 Pt Activity O2 Therapy BP BP Location BP Method Pt Position [facility]   09/21/22 1444 82 Monitor 18 94 % At rest None (Room air) 105/58 Right upper arm Automatic Sitting LG          Prior Living Environment    Flowsheet Row Most Recent Value   Current Living Arrangements home   Living Environment Comment Lives w/  in 1SH - 5STE, has 1st FL Master bed/bathroom set up (stall w/ bench)        Prior Level of Function    Flowsheet Row Most Recent Value   Dominant Hand right   Ambulation independent   Transferring independent   Toileting independent   Bathing independent   Dressing independent   Eating independent   Communication understands/communicates without difficulty   Prior Level of Function Comment reports being Independent w/all fxn'l/ADL tasks w/o AD -  PTA   Assistive Device Currently Used at Home none           PT Evaluation and Treatment - 09/21/22 1438        PT Time  Calculation    Start Time 1438     Stop Time 1452     Time Calculation (min) 14 min        Session Details    Document Type initial evaluation     Mode of Treatment physical therapy        General Information    Patient Profile Reviewed yes     Patient/Family/Caregiver Comments/Observations  (Cal) present T/O session     Existing Precautions/Restrictions fall        Cognition/Psychosocial    Affect/Mental Status (Cognition) WFL     Orientation Status (Cognition) oriented x 4     Follows Commands (Cognition) WFL     Comment, Cognition pleasant & cooperative, motivated to work w/ PT        Sensory    Hearing Status WFL        Vision Assessment/Intervention    Visual Impairment/Limitations corrective lenses full-time        Sensory Assessment (Somatosensory)    Sensory Assessment (Somatosensory) LE sensation intact        Range of Motion (ROM)    Range of Motion ROM is WFL;bilateral lower extremities        Strength (Manual Muscle Testing)    Strength (Manual Muscle Testing) strength is WFL;bilateral lower extremities        Bed Mobility    [place], Supine to Sit supervision     [place], Sit to Supine supervision     Assistive Device head of bed elevated;none     Comment (Bed Mobility) able to move in slow, small increments to get to EOB w/ HOB elevated; upon return to supine on flattened bed surface w/ less time & effort to safely negotiate        Transfers    Transfers toilet transfer;car transfer     Comment practiced x 3: from EOB/to chair, to/from toilet, to/from car        Sit to Stand Transfer    [place], Sit to Stand Transfer supervision     Comment using BOOKER harden G push off techniques        Stand to Sit Transfer    [place], Stand to Sit Transfer supervision     Comment fina safe reach back w/ RUE/controlled descent        Toilet Transfer    Transfer Technique stand-sit     [place], Toilet Transfer close supervision     Assistive Device grab bars/safety frame     Comment  using R grab bar able to lower onto toilet safely        Car Transfer    Transfer Technique sit-stand;stand-sit     [place], Car Transfer modified independence     Comment safely able to negotiate in/out of car w/o (A) or cues using RUE        Gait Training    [place], Gait independent     Assistive Device none     Distance in Feet 430 feet     Pattern (Gait) step-through     Deviations/Abnormal Patterns (Gait) gait speed decreased     Comment (Gait/Stairs) slow, steady recipricol pattern w/o any overt LOB, able to converse & ambulate w/o difficulty        Stairs Training    [place], Stairs modified independence     Assistive Device railing     Handrail Location (Stairs) right side (ascending)     Number of Stairs 4     Ascending Stairs Technique step-over-step     Descending Stairs Technique step-over-step        Balance    Static Sitting Balance WFL;unsupported;sitting, edge of bed     Static Standing Balance WFL;unsupported   no LOB       Therapeutic Exercise    Therapeutic Exercise --   Shld ROM exercises w/ OT (POD#0)       AM-PAC (TM) - Mobility (Current Function)    Turning from your back to your side while in a flat bed without using bedrails? 4 - None     Moving from lying on your back to sitting on the side of a flat bed without using bedrails? 4 - None     Moving to and from a bed to a chair? 4 - None     Standing up from a chair using your arms? 4 - None     To walk in a hospital room? 4 - None     Climbing 3-5 steps with a railing? 4 - None     AM-PAC (TM) Mobility Score 24        Assessment/Plan (PT)    Daily Outcome Statement Pt is very pleasant & cooperative - moves well  & appears to be functioning @/near her Independent fxn'l baseline - there are no fxn'l goals req'ing PT intervention at this time.  Anticipate D/C home when medically stable.  D/C PT     Rehab Potential good, to achieve stated therapy goals     Therapy Frequency evaluation only     Planned Therapy Interventions --    NONE              PT Assessment/Plan    Flowsheet Row Most Recent Value   PT Recommended Discharge Disposition home with outpatient services  [when cleared by surgeon] at 09/21/2022 1438   Anticipated Equipment Needs at Discharge (PT) none at 09/21/2022 1438   Patient/Family Therapy Goals Statement wants to return directly home at 09/21/2022 1438                    Education Documentation  Transfers, taught by Hodan Abel PT at 9/21/2022  3:14 PM.  Learner: Patient  Readiness: Eager  Method: Explanation, Demonstration  Response: Verbalizes Understanding, Demonstrated Understanding  Comment: Pt educated on the role of PT    Gait Training, taught by Hodan Abel PT at 9/21/2022  3:14 PM.  Learner: Patient  Readiness: Eager  Method: Explanation, Demonstration  Response: Verbalizes Understanding, Demonstrated Understanding  Comment: Pt educated on the role of PT    Bed Mobility, taught by Hodan Abel PT at 9/21/2022  3:14 PM.  Learner: Patient  Readiness: Eager  Method: Explanation, Demonstration  Response: Verbalizes Understanding, Demonstrated Understanding  Comment: Pt educated on the role of PT

## 2022-09-21 NOTE — PROGRESS NOTES
Patient:  Radha Chiang  Location:  [facility] 5PAV 5402  MRN:  [id number]  Today's date:  9/21/2022    RN cleared pt for therapy prior to session. Pt left in bedside chair with alarm set, call bell and personal items accessible, all questions answered and all needs addressed.  Spouse at bedside. RN notified of position and performance    Radha is a 72 y.o. female admitted on 9/21/2022 with Arthritis of left shoulder region [M19.012]  S/P shoulder replacement, left [Z96.612]. Principal problem is S/P shoulder surgery.    Past Medical History  Radha has a past medical history of Activated protein C resistance (CMS/HCC), Anxiety, Arthritis, Back pain, COVID-19 (05/31/2022), COVID-19 vaccine series completed, Depression, GERD (gastroesophageal reflux disease), History of loop recorder, History of Mohs surgery for squamous cell carcinoma of skin, Hypertension, Lipid disorder, Mini stroke, Prothrombin gene mutation (CMS/HCC), Shoulder pain, and SVT (supraventricular tachycardia) (CMS/HCC).    History of Present Illness   Pt is a 73 y/o female s/p L aTSA on 9/21/22  (Philippe).      OT Vitals    Date/Time Pulse HR Source Resp SpO2 Pt Activity O2 Therapy BP BP Location BP Method Pt Position [facility]   09/21/22 1444 82 Monitor 18 94 % At rest None (Room air) 105/58 Right upper arm Automatic Sitting LG          Prior Living Environment    Flowsheet Row Most Recent Value   Current Living Arrangements home   Living Environment Comment Lives w/  in 1SH - 5STE, has 1st FL Master bed/bathroom set up (stall w/ bench)        Prior Level of Function    Flowsheet Row Most Recent Value   Dominant Hand right   Ambulation independent   Transferring independent   Toileting independent   Bathing independent   Dressing independent   Eating independent   Communication understands/communicates without difficulty   Prior Level of Function Comment reports being Independent w/all fxn'l/ADL tasks w/o AD -  PTA   Assistive Device  Currently Used at Home none        Occupational Profile    Flowsheet Row Most Recent Value   Reason for Services/Referral S/p L aTSA   Environmental Supports and Barriers Supportive spouse           OT Evaluation and Treatment - 09/21/22 1442        OT Time Calculation    Start Time 1442     Stop Time 1505     Time Calculation (min) 23 min        Session Details    Document Type initial evaluation     Mode of Treatment occupational therapy        General Information    Patient Profile Reviewed yes     Onset of Illness/Injury or Date of Surgery 09/21/22     Referring Physician Philippe     Patient/Family/Caregiver Comments/Observations RN cleared, spouse present throughout     General Observations of Patient Pt rec'd in bathroom     Existing Precautions/Restrictions fall;weight bearing;shoulder;range of motion        Weight-bearing Status    Left UE Weight-Bearing Status non weight-bearing (NWB)        Cognition/Psychosocial    Affect/Mental Status (Cognition) WFL     Orientation Status (Cognition) oriented x 4     Follows Commands (Cognition) WFL     Cognitive Function WFL     Comment, Cognition Pleasant and cooperative, patient and spouse both receptive to education        Hearing Assessment    Hearing Status WFL        Vision Assessment/Intervention    Visual Impairment/Limitations corrective lenses full-time        Sensory Assessment    Left UE Sensory Assessment other (see comments)   numb from surgery    Right UE Sensory Assessment intact        Range of Motion (ROM)    Left Upper Extremity (ROM) other (see comments)   no AROM per orders    Right Upper Extremity (ROM) right UE ROM is WFL        Strength (Manual Muscle Testing)    Left Upper Extremity Strength other (see comments)   not tested 2/2 NWB    Right Upper Extremity Strength right UE strength is WFL        Bed Mobility    [place], Supine to Sit supervision     [place], Sit to Supine supervision     Assistive Device none     Comment (Bed  Mobility) Cues for awareness of LUE positioning when sitting up at EOB        Sit to Stand Transfer    [place], Sit to Stand Transfer supervision     Assistive Device none     Comment Able to push up with RUE, denied dizziness        Stand to Sit Transfer    [place], Stand to Sit Transfer supervision     Assistive Device none     Comment Fair controlled descent        Toilet Transfer    [place], Toilet Transfer other (see comments)     Comment Completed with PT initially; stood from toilet without assist        Safety Issues, Functional Mobility    Impairments Affecting Function (Mobility) range of motion (ROM);strength        Balance    Static Sitting Balance WFL     Dynamic Sitting Balance WFL     Static Standing Balance WFL     Dynamic Standing Balance mild impairment        Motor Skills    Functional Endurance WFL        Therapeutic Exercise    Therapeutic Exercise upper extremity        Upper Extremity (Therapeutic Exercise)    Exercise Position/Type supine     General Exercise wand exercises, right assist to left     Range of Motion Exercises shoulder flexion/extension;shoulder external/internal rotation   140* flexion, 40* external rotation    Comment Patient required min A to complete PROM in supine. Educated on exercises per Dr. Paez' protocol and provided with handout. Would benefit from ongoing education/practice        Grooming    Self-Performance washes, rinses and dries hands     [place] supervision     Position sink side     Comment Cues for technique and compensatory strategies in order to wash both hands        Toileting    [place] distant supervision;adjust/manage clothing;perform bladder hygiene     Position unsupported standing        AM-PAC (TM) - ADL (Current Function)    Putting on and taking off regular lower body clothing? 3 - A Little     Bathing? 3 - A Little     Toileting? 3 - A Little     Putting on/taking off regular upper body clothing? 2 - A Lot     How  much help for taking care of personal grooming? 3 - A Little     Eating meals? 3 - A Little     AM-PAC (TM) ADL Score 17        Assessment/Plan (OT)    Daily Outcome Statement OT eval complete. Pt presents POD 0 s/p L aTSA with expected post-op impairments in LUE strength and ROM as well as balance. Despite this, she is able to complete bed mobility and transfers at supervision level, LUE PROM therex with min A and toileting/grooming tasks with supervision. Cont OT POC, rec home with assist at d/c     Rehab Potential good, to achieve stated therapy goals     Therapy Frequency daily     Planned Therapy Interventions activity tolerance training;BADL retraining;functional balance retraining;occupation/activity based interventions;ROM/therapeutic exercise;transfer/mobility retraining               OT Assessment/Plan    Flowsheet Row Most Recent Value   OT Recommended Discharge Disposition home with assistance at 09/21/2022 1442   Anticipated Equipment Needs At Discharge (OT) none at 09/21/2022 1442   Patient/Family Therapy Goal Statement to go home at 09/21/2022 1442                    Education Documentation  Treatment Plan, taught by Giovanna Brownlee OT at 9/21/2022  3:48 PM.  Learner: Patient  Readiness: Acceptance  Method: Explanation  Response: Verbalizes Understanding  Comment: OT role/POC, safety with transfers/ADLs, LUE PROM per Dr. Paez' protocol, call bell use          OT Goals    Flowsheet Row Most Recent Value   Transfer Goal 1    Activity/Assistive Device toilet at 09/21/2022 1442   [place] modified independence at 09/21/2022 1442   Time Frame by discharge at 09/21/2022 1442   Progress/Outcome goal ongoing at 09/21/2022 1442   Dressing Goal 1    Activity/Adaptive Equipment dressing skills, all at 09/21/2022 1442   [place] minimum assist (75% or more patient effort) at 09/21/2022 1442   Time Frame by discharge at 09/21/2022 1442   Progress/Outcome goal ongoing at 09/21/2022 1442   Toileting  Goal 1    Activity/Assistive Device toileting skills, all at 09/21/2022 1442   [place] modified independence at 09/21/2022 1442   Time Frame by discharge at 09/21/2022 1442   Progress/Outcome goal ongoing at 09/21/2022 1442

## 2022-09-21 NOTE — HOSPITAL COURSE
Radha is a 72 y.o. female admitted on 9/21/2022 with Arthritis of left shoulder region [M19.012]  S/P shoulder replacement, left [Z96.612]. Principal problem is S/P shoulder surgery.    Past Medical History  Radha has a past medical history of Activated protein C resistance (CMS/HCC), Anxiety, Arthritis, Back pain, COVID-19 (05/31/2022), COVID-19 vaccine series completed, Depression, GERD (gastroesophageal reflux disease), History of loop recorder, History of Mohs surgery for squamous cell carcinoma of skin, Hypertension, Lipid disorder, Mini stroke, Prothrombin gene mutation (CMS/HCC), Shoulder pain, and SVT (supraventricular tachycardia) (CMS/HCC).    History of Present Illness   Pt is a 73 y/o female s/p L aTSA on 9/21/22  (Philippe).

## 2022-09-21 NOTE — OR SURGEON
Pre-Procedure patient identification:  I am the primary operating surgeon/proceduralist and I have identified the patient on 09/21/22 at 6:26 AM Mello Paez MD  Phone Number: [phone]

## 2022-09-21 NOTE — ASSESSMENT & PLAN NOTE
No personal history of DVT/PE  Testing positive for activated protein C resistance, prothrombin gene mutation  Seen by hematology and plan for Xarelto postop for DVT prophylaxis  - Xarelto for DVT ppx as per Heme recs  - Recommend early ambulation and SCDs postop

## 2022-09-21 NOTE — ASSESSMENT & PLAN NOTE
S/p L Anatomic total shoulder arthroplasty with biceps tenodesis and mild asymmetric reaming and screw removal on 9/21/22  - Pain control per orthopedics  - DVT ppx per orthopedics  - PT/OT  - Encourage IS  - Bowel regimen

## 2022-09-21 NOTE — PLAN OF CARE
Shoulder and Elbow Surgery Postop Plan of Care Note:     POD#0 s/p L aTSA      -DVT prophy: xarelto    -Abx: ancef periop   -NWB LUE   -Ultrasling  -ROM Restrictions: 140 forward elevation, 40 external rotation      Joe Alvarez MD  Shoulder and Elbow Fellow   [facility]

## 2022-09-21 NOTE — PROGRESS NOTES
Hospital Medicine Service -  Daily Progress Note       SUBJECTIVE   Interval History:   Patient seen and evaluated in the PACU. She is doing well. She denies severe pain. She denies CP, dyspnea, abdomina pain, N/V.      OBJECTIVE      Vital signs in last 24 hours:  Temp:  [36.1 °C (97 °F)-36.3 °C (97.4 °F)] 36.1 °C (97 °F)  Heart Rate:  [71-77] 72  Resp:  [10-22] 18  BP: ()/(50-90) 102/55    Intake/Output Summary (Last 24 hours) at 9/21/2022 1334  Last data filed at 9/21/2022 1114  Gross per 24 hour   Intake 1000 ml   Output 150 ml   Net 850 ml       PHYSICAL EXAMINATION      Physical Exam  Vitals and nursing note reviewed.   Constitutional:       General: She is not in acute distress.     Appearance: Normal appearance. She is not toxic-appearing.   HENT:      Head: Normocephalic and atraumatic.   Eyes:      Conjunctiva/sclera: Conjunctivae normal.   Cardiovascular:      Rate and Rhythm: Normal rate and regular rhythm.      Heart sounds: Normal heart sounds.   Pulmonary:      Effort: Pulmonary effort is normal. No respiratory distress.      Breath sounds: Normal breath sounds.   Abdominal:      General: Bowel sounds are normal. There is no distension.      Palpations: Abdomen is soft.      Tenderness: There is no abdominal tenderness.   Musculoskeletal:      Cervical back: Neck supple.      Comments: L Shoulder: in sling, + radial pulse, incision C/D/I   Skin:     General: Skin is warm and dry.   Neurological:      General: No focal deficit present.      Mental Status: She is oriented to person, place, and time.   Psychiatric:         Mood and Affect: Mood normal.            LINES, CATHETERS, DRAINS, AIRWAYS, AND WOUNDS   Lines, Drains, and Airways:  Wounds (agree with documentation and present on admission):  Surgical Incision Shoulder Left (Active)   Number of days: 0         Comments:      LABS / IMAGING / TELE      Labs  Outpatient labs from 8/29/22 personally reviewed: Cr 0.63, K 4.8, Hgb  11.2    SARS-CoV-2 (COVID-19) (no units)   Date/Time Value   09/16/2022 1041 Negative     ECG/Telemetry  SR on PACU monitor    ASSESSMENT AND PLAN      * S/P shoulder surgery  Assessment & Plan  S/p L Anatomic total shoulder arthroplasty with biceps tenodesis and mild asymmetric reaming and screw removal  - Pain control per orthopedics  - DVT ppx per orthopedics  - PT/OT  - Encourage IS  - Bowel regimen    Hx of transient ischemic attack (TIA)  Assessment & Plan  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  On aspirin, statin  - Continue statin; Resume ASA when cleared by Ortho    Hypertension  Assessment & Plan  - Continue Cardizem; Resume Avapro postop as long as renal function is stable  - Monitor VS    History of blood clotting disorder  Assessment & Plan  No personal history of DVT/PE  Testing positive for activated protein C resistance, prothrombin gene mutation  Seen by hematology and plan for Xarelto postop for DVT prophylaxis  - Xarelto for DVT ppx as per Heme recs  - Recommend early ambulation and SCDs postop         VTE Assessment: [place]    VTE Prophylaxis:  Current anticoagulants:    None      Code Status: No Order      Estimated Discharge Date:      Disposition Planning: as per Ortho     COLTON Collins  9/21/2022

## 2022-09-21 NOTE — ASSESSMENT & PLAN NOTE
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

## 2022-09-21 NOTE — OP NOTE
Operative report     Date of operation: 9/21/2022    Preoperative diagnosis: Glenohumeral arthritis left shoulder M19.012, mild anterior subluxation left shoulder M24.412, retained painful screw left glenoid T84.84 XA, biceps tendinitis left shoulder M75.22    Postoperative diagnosis: Same    Procedure: Anatomic total shoulder arthroplasty with biceps tenodesis and mild asymmetric reaming 89129, screw removal 49763    Surgeon:  Mello Paez Jr MD    Assistant: Joe Alvarez MD    Anesthesia:   General with block    Complications: None    Indications : The patient is a 72-year-old female with end-stage glenohumeral arthritis involving her left shoulder who is failed nonoperative management.  She had a prior glenoid fracture repair has mild anterior subluxation with glenoid bone loss.  She also has biceps tendinitis.  We therefore brought her to the operating for above procedure.    Findings: On exam under anesthesia she had about 20 degrees of external rotation with her arm at her side may be 30.  The fracture was completely healed.  We placed a DJ O 46 glenoid all holes were contained within the glenoid vault.  We placed cement in the center hole placed a 46 glenoid and remove the screw with washer.  We placed a DJ O stemless humerus size 1 with a 46 x 18 head.  We did through a lesser tuberosity osteotomy which was repaired anatomically.  The anterior inferior capsule was excised, the remaining capsule was released.  Biceps was tenodesed to the upper border of pectoralis major.  We did not use a drain.  The cephalic vein was preserved and taken laterally.  Blood loss was 150 cc.    Description of procedure:    After proper written consent was obtained the patient was brought to the operating room placed on the operating table in supine position.  After adequate anesthesia was obtained, the patient's shoulder was examined under anesthesia and the above findings were noted.  The patient was then brought to the  edge of the operating table, the operating table was placed in a semirecumbent position, and the patient's arm and shoulder were prepped and draped in normal sterile fashion.  A 10-12 cm deltopectoral incision was made.  The incision was carried sharply down the level of deep fascia.  Cephalic vein and deltoid were taken laterally and the pectoralis major was taken medially.  The clavipectoral fascia was incised just lateral to the conjoined tendon.  This incision was carried up to but not into the coracoacromial ligament.  Digital palpation was used to verify the integrity of the axillary nerve, which was protected throughout the procedure.  The musculocutaneous nerve was not within our surgical field.  We retracted the conjoined tendon medially and the deltoid laterally.  The anterior humeral circumflex vessels were clamped and coagulated.  Biceps was uncovered from the upper border of pectoralis major to the supraglenoid tubercle.  It was then tenodesed to the upper border of pectoralis major with 2 Ethibond sutures and was then released just proximal to this tenodesis site.  It was then resected from the supraglenoid tubercle.  A large curved osteotome was used to perform a lesser tuberosity osteotomy.  The lesser tuberosity and subscapularis were reflected off the anterior capsule and 3 heavy nonabsorbable sutures were passed through the bone tendon junction and clamped.  The subscapularis and lesser tuberosity were then retracted medially.  The anterior capsule was released off the anatomic neck starting superiorly and extending all the way down past the 6 o'clock position, taking care to protect the axillary nerve.  This allowed us to deliver the humerus into the wound with simultaneous abduction extension and external rotation.  All humeral osteophytes were removed and the anatomic neck was marked.  The humerus was then cut along the anatomic neck and native version leaving 2- 3 mm of bone medial to the  rotator cuff reflection.  The rotator cuff was inspected and found to be intact.  We then sized the humeral epiphysis and selected the appropriate head size.  The humerus was then retracted posteriorly with a large Fukuda retractor.  The anterior and inferior capsule was excised in a reverse double pronged Bankart retractor was placed on the anterior neck of the scapula.  The labrum was excised circumferentially and the remaining capsule was released.  A blunt Hohmann was placed posterior superiorly.  I then exposed the screw on the anterior surface of the glenoid and removed it.  A guidepin was placed for reaming using the appropriate sizer disc and slightly inferiorly tilting it.  The guidepin was drilled to the medial cortex but not through it.  I then reamed over the guidepin until we had a concentric surface.  I then placed a peripheral drill guide and drilled the 3 peripheral holes.  The peripheral drill guide was removed.  We then drilled the center hole by over drilling the guide pin.  The guide pin was then removed.  We inspected all 4 holes and found that all of them were contained within the glenoid vault except the ones mentioned under findings above.  We then pulse irrigated and dried the holes.  A trial glenoid component was placed and fit nicely.  The trial component was then removed and we again pulse irrigated the holes dried the holes and then placed pressurized cement within the center hole and impacted the appropriate sized DJ O anatomic glenoid.  This was completed, the humerus was then redelivered.  The guidepin was place for the stemless implant. Carev was taken to protect the axillary nerve. The calcar planar was used to plane the osteotomy surface.  Then used a planar to inset for the body.  I then drilled for the central drill.  Selecting the appropriate sized body, the punch for this body was inserted to this appropriate depth.  The guidepin was removed.  We then trialed the humeral head  and found that the originally sized humerus was the appropriate size.  We then reduced the humerus and found that we had excellent stability with good soft tissue length.  I then redelivered the humerus and remove the trial implant.  A real implant was then placed and impacted completely.  This was done with the head on.  We then reduced the humerus.  I placed a two-point 4 suture tack anchor just medial to the osteotomy bed.  These 2 sutures were passed through the bone tendon junction of the lesser tuberosity.  The deep limbs of the 3 previously placed nonabsorbable sutures around the lesser tuberosity were then passed through the osteotomy bed and out the lateral cortex.  The most superior and inferior of these sutures were accompanied by the superior and inferior anchor sutures.  The lesser tuberosity was then reduced and the FiberWire sutures were tied laterally thereby compressing the lesser tuberosity on to its footprint.  I then tied the 3 interfragmentary sutures and closed the rotator interval laterally with an additional heavy nonabsorbable suture.  Digital palpation was again used to verify the integrity of the axillary nerve.  We again copiously pulse irrigated. We then evaluated the status of the wound and if a drain was necessary it was placed.  The wound was then closed in standard fashion with 2-0 Vicryl in the subcutaneous tissue and a running subcuticular 3-0 Monocryl for skin.  The patient was then placed in a sterile dressing and a sling.  Patient tolerated the procedure well.

## 2022-09-21 NOTE — ANESTHESIA PROCEDURE NOTES
Peripheral Block    Patient location during procedure: pre-op  Reason for block: procedure for pain and post-op pain management  Staffing  Performed: anesthesiologist   Anesthesiologist: Liang Tobar MD  Preanesthetic Checklist  Completed: patient identified, site marked, surgical consent, pre-op evaluation, timeout performed, IV checked, risks and benefits discussed, monitors and equipment checked and sterile field maintained during procedure  Peripheral Block  Patient position: sitting  Prep: ChloraPrep and site prepped and draped  Patient monitoring: heart rate, cardiac monitor, continuous pulse ox and blood pressure  Block type: interscalene  Laterality: left  Injection technique: single-shot      Guidance: nerve stimulator and ultrasound guided    Image visualization comments : Ultrasound used to visualize needle placement in appropriate tissue plane.: Ultrasound used to visualize medication disbursement around appropriate nerve structures.      Needle  Needle length: 3 1/8 in  Needle localization: nerve stimulator and ultrasound guidance  Test dose: negative  Assessment  Injection assessment: negative aspiration for heme, incremental injection, local visualized surrounding nerve on ultrasound and transient paresthesias  Paresthesia pain: immediately resolved  Heart rate change: no  Slow fractionated injection: yes  Additional Notes  Sterile prep.  With Ultrasound guidance and nerve stim to 0.5 mA, 30 cc 0.5% ropivicaine plus 100 mcg epi injected, neg asp q 5cc, pt nathalie w/o problem

## 2022-09-21 NOTE — PLAN OF CARE
Problem: Adult Inpatient Plan of Care  Goal: Plan of Care Review  Outcome: Progressing  Flowsheets (Taken 9/21/2022 1513)  Plan of Care Reviewed With: patient  Outcome Summary: PT eval completed.

## 2022-09-21 NOTE — PLAN OF CARE
Problem: Adult Inpatient Plan of Care  Goal: Plan of Care Review  Outcome: Progressing  Flowsheets (Taken 9/21/2022 5088)  Progress: improving  Plan of Care Reviewed With: patient  Outcome Summary: OT eval complete     Problem: Joint Function Impaired (Shoulder Arthroplasty)  Goal: Optimal Functional Ability  Outcome: Progressing

## 2022-09-21 NOTE — ANESTHESIOLOGIST PRE-PROCEDURE ATTESTATION
Pre-Procedure Patient Identification:  I am the Primary Anesthesiologist and have identified the patient on 09/21/22 at 7:33 AM.   I have confirmed the procedure(s) will be performed by the following surgeon/proceduralist Mello Paez MD.

## 2022-09-22 VITALS
WEIGHT: 144 LBS | OXYGEN SATURATION: 100 % | HEART RATE: 70 BPM | DIASTOLIC BLOOD PRESSURE: 56 MMHG | RESPIRATION RATE: 18 BRPM | BODY MASS INDEX: 24.59 KG/M2 | TEMPERATURE: 97.9 F | HEIGHT: 64 IN | SYSTOLIC BLOOD PRESSURE: 110 MMHG

## 2022-09-22 LAB
ANION GAP SERPL CALC-SCNC: 10 MEQ/L (ref 3–15)
BUN SERPL-MCNC: 15 MG/DL (ref 8–20)
CALCIUM SERPL-MCNC: 8.7 MG/DL (ref 8.9–10.3)
CHLORIDE SERPL-SCNC: 103 MEQ/L (ref 98–109)
CO2 SERPL-SCNC: 26 MEQ/L (ref 22–32)
CREAT SERPL-MCNC: 0.7 MG/DL (ref 0.6–1.1)
ERYTHROCYTE [DISTWIDTH] IN BLOOD BY AUTOMATED COUNT: 14.8 % (ref 11.7–14.4)
GFR SERPL CREATININE-BSD FRML MDRD: >60 ML/MIN/1.73M*2
GLUCOSE SERPL-MCNC: 130 MG/DL (ref 70–99)
HCT VFR BLDCO AUTO: 27.7 % (ref 35–45)
HGB BLD-MCNC: 8.7 G/DL (ref 11.8–15.7)
MCH RBC QN AUTO: 29.7 PG (ref 28–33.2)
MCHC RBC AUTO-ENTMCNC: 31.4 G/DL (ref 32.2–35.5)
MCV RBC AUTO: 94.5 FL (ref 83–98)
PDW BLD AUTO: 11.7 FL (ref 9.4–12.3)
PLATELET # BLD AUTO: 235 K/UL (ref 150–369)
POTASSIUM SERPL-SCNC: 4.2 MEQ/L (ref 3.6–5.1)
RBC # BLD AUTO: 2.93 M/UL (ref 3.93–5.22)
SODIUM SERPL-SCNC: 139 MEQ/L (ref 136–144)
WBC # BLD AUTO: 8.2 K/UL (ref 3.8–10.5)

## 2022-09-22 PROCEDURE — 63700000 HC SELF-ADMINISTRABLE DRUG: Performed by: NURSE PRACTITIONER

## 2022-09-22 PROCEDURE — 99232 SBSQ HOSP IP/OBS MODERATE 35: CPT | Performed by: HOSPITALIST

## 2022-09-22 PROCEDURE — 25800000 HC PHARMACY IV SOLUTIONS: Performed by: NURSE PRACTITIONER

## 2022-09-22 PROCEDURE — 97535 SELF CARE MNGMENT TRAINING: CPT | Mod: GO

## 2022-09-22 PROCEDURE — 36415 COLL VENOUS BLD VENIPUNCTURE: CPT | Performed by: NURSE PRACTITIONER

## 2022-09-22 PROCEDURE — 80048 BASIC METABOLIC PNL TOTAL CA: CPT | Performed by: NURSE PRACTITIONER

## 2022-09-22 PROCEDURE — 85027 COMPLETE CBC AUTOMATED: CPT | Performed by: NURSE PRACTITIONER

## 2022-09-22 PROCEDURE — 97530 THERAPEUTIC ACTIVITIES: CPT | Mod: GO

## 2022-09-22 RX ORDER — ASPIRIN 81 MG/1
81 TABLET ORAL DAILY
Refills: 0
Start: 2022-09-22

## 2022-09-22 RX ORDER — ACETAMINOPHEN 500 MG
1000 TABLET ORAL
Qty: 180 TABLET | Refills: 0
Start: 2022-09-22 | End: 2022-10-22

## 2022-09-22 RX ORDER — OXYCODONE HYDROCHLORIDE 5 MG/1
5 TABLET ORAL EVERY 6 HOURS PRN
Refills: 0
Start: 2022-09-22 | End: 2022-09-27

## 2022-09-22 RX ADMIN — SODIUM CHLORIDE: 9 INJECTION, SOLUTION INTRAVENOUS at 03:27

## 2022-09-22 RX ADMIN — ACETAMINOPHEN 650 MG: 325 TABLET, FILM COATED ORAL at 06:14

## 2022-09-22 RX ADMIN — OXYCODONE HYDROCHLORIDE 10 MG: 5 TABLET ORAL at 06:18

## 2022-09-22 RX ADMIN — POLYETHYLENE GLYCOL 3350 17 G: 17 POWDER, FOR SOLUTION ORAL at 09:09

## 2022-09-22 RX ADMIN — OXYCODONE HYDROCHLORIDE 10 MG: 5 TABLET ORAL at 12:02

## 2022-09-22 RX ADMIN — RIVAROXABAN 10 MG: 10 TABLET, FILM COATED ORAL at 09:09

## 2022-09-22 RX ADMIN — ESCITALOPRAM OXALATE 20 MG: 20 TABLET ORAL at 09:09

## 2022-09-22 RX ADMIN — SENNOSIDES AND DOCUSATE SODIUM 1 TABLET: 50; 8.6 TABLET ORAL at 09:09

## 2022-09-22 RX ADMIN — PANTOPRAZOLE SODIUM 40 MG: 40 TABLET, DELAYED RELEASE ORAL at 09:09

## 2022-09-22 RX ADMIN — ACETAMINOPHEN 650 MG: 325 TABLET, FILM COATED ORAL at 12:02

## 2022-09-22 ASSESSMENT — COGNITIVE AND FUNCTIONAL STATUS - GENERAL
DRESSING REGULAR UPPER BODY CLOTHING: 3 - A LITTLE
HELP NEEDED FOR BATHING: 3 - A LITTLE
AFFECT: WFL
EATING MEALS: 4 - NONE
TOILETING: 4 - NONE
HELP NEEDED FOR PERSONAL GROOMING: 4 - NONE
DRESSING REGULAR LOWER BODY CLOTHING: 4 - NONE

## 2022-09-22 ASSESSMENT — PAIN SCALES - GENERAL: PAIN_LEVEL: 0

## 2022-09-22 NOTE — PROGRESS NOTES
Orthopaedic Surgery Progress Note    Interval History:  NAEON, pain controlled    Vital Signs:   Vitals:    09/22/22 0344   BP: 111/62   Pulse: 67   Resp: 16   Temp: 36.8 °C (98.3 °F)   SpO2: 98%       Physical Exam:   General:   No acute distress   Symmetric chest rise bilaterally with normal effort     Musculoskeletal:   Left Upper Extremity  Inspection: Surgical incisions without erythema or drainage. Appropriate tenderness around the incision.  Motor: Fires WF/WE/FF/HI  Sensory: SILT in axillary/median/radial/ulnar nerve distributions   Vascular: Palpable radial pulse. Brisk capillary refill   Compartments soft and compressible     Assessment and Plan   Radha Chiang is a 72 y.o. female who is status post left aTSA    -- POD: 1 Day Post-Op   -- WBS: NWB  -- ROM: 140 forward elevation, 40 external rotation  -- Brace: Sling in place  -- Analgesia   -- DVT PPX: xarelto   -- Abx: ancef periop  -- PT OT recs appreciated   -- [facility] [place] recs appreciated    Vernon Wilson MD

## 2022-09-22 NOTE — ANESTHESIA POSTPROCEDURE EVALUATION
Patient: Radha Chiang    Procedure Summary     Date: 09/21/22 Room / Location: [facility] OR  / [facility] OR [facility]    Anesthesia Start: 0758 Anesthesia Stop: 1121    Procedure: Left Anatomic Total Shoulder Arthroplasty (Left Shoulder) Diagnosis:       Arthritis of left shoulder region      (Arthritis of left shoulder region [M19.012])    Surgeons: Mello Paez MD Responsible Provider: Liang Tobar MD    Anesthesia Type: regional, general ASA Status: 2          Anesthesia Type: regional, general  PACU Vitals  9/21/2022 1114 - 9/21/2022 1214      9/21/2022  1120 9/21/2022  1130 9/21/2022  1140 9/21/2022  1150    BP: 101/50 96/53 103/58 89/50    Temp: 36.3 °C (97.4 °F) -- -- --    Pulse: 73 73 74 74    Resp: 16 18 16 16    SpO2: 96 % 97 % 97 % 97 %              9/21/2022  1200 9/21/2022  1210          BP: 90/51 83/54      Temp: -- --      Pulse: 74 74      Resp: 13 15      SpO2: 97 % 97 %              Anesthesia Post Evaluation    Pain score: 0  Pain management: satisfactory to patient  Mode of pain management: IV medication and additional block medications  Patient location during evaluation: PACU  Patient participation: complete - patient participated  Level of consciousness: awake and alert  Cardiovascular status: acceptable  Airway Patency: adequate  Respiratory status: acceptable  Hydration status: stable  Anesthetic complications: no  Comments: Pt was stable for pacu d/c

## 2022-09-22 NOTE — DISCHARGE INSTRUCTIONS
Please take Xarelto 10mg daily for 4 weeks as instructed by your hematologist.       Procedure name: Total Shoulder Arthroplasty - Philippe     Symptoms to call for instructions  The following are CONCERNING SYMPTOMS after a total shoulder replacement    PLEASE CALL YOUR DOCTOR IF:                * You have excessive redness of the incision.              * You have drainage for more than 4 days.              * You have a fever greater than 101.5 degrees Fahrenheit.    Activity restrictions instructions  The following are ACTIVITY RESTRICTIONS after a total shoulder replacement    * A sling has been provided for you.  Remove the sling 5 times each day to perform motion exercises as taught in the hospital.  * After the first 48 to 72 hours, you may remove the sling to bathe, dress, and complete any range of motion exercises taught to you prior to discharge from the hospital.  * You will be given further instructions on sling use at your first postoperative visit.    * Active reaching and lifting are not permitted.  You may use the operative arm for activities of daily living that do not require the operative arm to leave the side of the body, such as eating, drinking, bathing, etc.  * You should perform assisted overhead reaching and external rotation (outward turning) exercises with the operative arm if taught and instructed to do so prior to discharge from the hospital.  Both exercises should be done with the non-operative arm used as the therapist arm while the operative arm remains relaxed.  Ten of each exercise should be done five times daily.   *  Overhead Reach Exercise is helping to lift your stiff arm up as high as it will go.  To stretch your overhead reach, lie flat on your back, relax, and grasp the wrist of the tight shoulder with your opposite hand.  Using the power in your opposite arm, bring the stiff arm up as far as it is comfortable.  Start holding it for ten seconds and then work up to where you  can hold it for a count of 30.  Breathe slowly and deeply while the arm is moved.  Repeat this stretch ten times, trying to help the arm up a little higher each time.     * External Rotation Exercise is turning the arm out to the side while your elbow stays close to your body.  External rotation is best stretched while you are lying on your back.  Hold a cane, yardstick, broom handle, or golf club in both hands.  Bend both elbows to a right angle.  Use steady, gentle force from your normal arm to rotate the hand of the stiff shoulder out away from your body.  Continue the rotation as far as it will go comfortably, holding it there for a count of 10.  Repeat this exercise ten times.        Wound care instructions  The following are INCISION CARE instructions after a total shoulder replacement           * Use ice on the shoulder intermittently over the first 48 hours after surgery                    (20 minutes on and 20 minutes off).  If you have a beige tape dressing, you may remove it after 2 days.  If you have a clear plastic dressing, it is waterproof and should not be removed  * You may shower immediately with the waterproof dressing. Otherwise, shower after the 5th day. The incision site can get wet after day 5 but CANNOT be submerged under water until your sutures/staples are removed.  * Do not rub the incision.  Make sure your axilla (armpit) is completely dry after showering.    Additional supplement instructions  In addition,    * Pain medication has been prescribed for you.  Take a stool softener (Colace, Dulcolax or Senakot) if you are using narcotic pain medications.  * Use your medication liberally as directed over the first 48 hours, and then begin to taper your use.  You may take Extra Strength Tylenol or Tylenol in addition to the narcotic pills early in the post-operative period (do not exceed 3g of Tylenol per day) and while titrating off of the narcotics as your pain improves.  * DO NOT take ANY  nonsteroidal anti-inflammatory pain medications: Advil, Motrin, Ibuprofen, Aleve, Naproxen, or Naprosyn.    * Please call [phone] or [phone] for any problems.  * Please call [phone] to make a follow-up appointment.  You should see the doctor 10-14 days after your surgery.

## 2022-09-22 NOTE — NURSING NOTE
Pt had Vtach with asymptomatic while she walked in the hallway with the nurse. Dr. Patrick awared and no n/o at this time.

## 2022-09-22 NOTE — PROGRESS NOTES
Hospital Medicine Service -  Daily Progress Note       SUBJECTIVE   Interval History:   Patient doing well today, reports controlled pain.   She denies HA, dizziness, CP, dyspnea, abdominal pain, N/V.      OBJECTIVE      Vital signs in last 24 hours:  Temp:  [36.1 °C (97 °F)-36.8 °C (98.3 °F)] 36.6 °C (97.9 °F)  Heart Rate:  [65-82] 65  Resp:  [10-22] 18  BP: ()/(50-67) 103/56    Intake/Output Summary (Last 24 hours) at 9/22/2022 0826  Last data filed at 9/22/2022 0006  Gross per 24 hour   Intake 900 ml   Output 900 ml   Net 0 ml       PHYSICAL EXAMINATION      Physical Exam  Vitals and nursing note reviewed.   Constitutional:       General: She is not in acute distress.     Appearance: Normal appearance. She is not toxic-appearing.   HENT:      Head: Normocephalic and atraumatic.   Eyes:      Conjunctiva/sclera: Conjunctivae normal.   Cardiovascular:      Rate and Rhythm: Normal rate and regular rhythm.      Heart sounds: Normal heart sounds.   Pulmonary:      Effort: Pulmonary effort is normal. No respiratory distress.      Breath sounds: Normal breath sounds.   Abdominal:      General: Bowel sounds are normal. There is no distension.      Palpations: Abdomen is soft.      Tenderness: There is no abdominal tenderness.   Musculoskeletal:      Cervical back: Neck supple.      Comments: L Shoulder: in sling, incision C/D/I, neurovascularly intact distally   Skin:     General: Skin is warm and dry.   Neurological:      General: No focal deficit present.      Mental Status: She is oriented to person, place, and time.   Psychiatric:         Mood and Affect: Mood normal.            LINES, CATHETERS, DRAINS, AIRWAYS, AND WOUNDS   Lines, Drains, and Airways:  Wounds (agree with documentation and present on admission):  Peripheral IV (Adult) 09/21/22 Distal;Posterior;Right Forearm (Active)   Number of days: 1       Surgical Incision Shoulder Left (Active)   Number of days: 1         Comments:      LABS / IMAGING /  TELE      Labs  Results from last 7 days   Lab Units 09/22/22  0343   SODIUM mEQ/L 139   POTASSIUM mEQ/L 4.2   CHLORIDE mEQ/L 103   CO2 mEQ/L 26   BUN mg/dL 15   CREATININE mg/dL 0.7   CALCIUM mg/dL 8.7*   GLUCOSE mg/dL 130*     Results from last 7 days   Lab Units 09/22/22  0343   WBC K/uL 8.20   HEMOGLOBIN g/dL 8.7*   HEMATOCRIT % 27.7*   PLATELETS K/uL 235     Microbiology Results     Procedure Component Value Units Date/Time    COVID-19 PCR PAT/Pre-procedural [[id number]]  (Normal) Collected: 09/16/22 1041    Specimen: Nasopharyngeal Swab from Nasopharynx Updated: 09/16/22 2100    Narrative:      The following orders were created for panel order COVID-19 PCR PAT/Pre-procedural.  Procedure                               Abnormality         Status                     ---------                               -----------         ------                     SARS-CoV-2 (COVID-19), PCR[[id number]]    Normal              Final result                 Please view results for these tests on the individual orders.    SARS-CoV-2 (COVID-19), PCR [[id number]]  (Normal) Collected: 09/16/22 1041    Specimen: Nasopharyngeal Swab from Nasopharynx Updated: 09/16/22 2100     SARS-CoV-2 (COVID-19) Negative    Narrative:      SARS-CoV-2 nucleic acid amplification diagnostic test approved under [employer].        Above labs have been personally reviewed.     SARS-CoV-2 (COVID-19) (no units)   Date/Time Value   09/16/2022 1041 Negative     ECG/Telemetry  SR; NSVT noted overnight    ASSESSMENT AND PLAN      * S/P shoulder surgery  Assessment & Plan  S/p L Anatomic total shoulder arthroplasty with biceps tenodesis and mild asymmetric reaming and screw removal on 9/21/22  - Pain control per orthopedics  - DVT ppx per orthopedics  - PT/OT  - Encourage IS  - Bowel regimen    Hx of transient ischemic attack (TIA)  Assessment & Plan  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  On aspirin, statin  - Continue statin; Resume ASA when cleared  by Ortho    Hypertension  Assessment & Plan  - Continue Cardizem and Avapro w/ hold parameters  - Monitor VS    History of blood clotting disorder  Assessment & Plan  No personal history of DVT/PE  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

## 2022-09-22 NOTE — PROGRESS NOTES
Patient:  Radha Chiang  Location:  [facility] 5PAV 5402  MRN:  [id number]  Today's date:  9/22/2022     Therapy communicated with RN regarding pt's appropriateness for therapy. RN approved pt for therapy session. Pt rec'd sitting in bedside chair, agreeable to therapy. After session, pt was left sitting in recliner, personal belongings and call bell within reach. RN notified of status/progress/positioning.    Radha is a 72 y.o. female admitted on 9/21/2022 with Arthritis of left shoulder region [M19.012]  S/P shoulder replacement, left [Z96.612]. Principal problem is S/P shoulder surgery.    Past Medical History  Radha has a past medical history of Activated protein C resistance (CMS/HCC), Anxiety, Arthritis, Back pain, COVID-19 (05/31/2022), COVID-19 vaccine series completed, Depression, GERD (gastroesophageal reflux disease), History of loop recorder, History of Mohs surgery for squamous cell carcinoma of skin, Hypertension, Lipid disorder, Mini stroke, Prothrombin gene mutation (CMS/HCC), Shoulder pain, and SVT (supraventricular tachycardia) (CMS/HCC).    History of Present Illness   Pt is a 73 y/o female s/p L aTSA on 9/21/22  (Philippe).          Prior Living Environment    Flowsheet Row Most Recent Value   Current Living Arrangements home   Living Environment Comment Lives w/  in 1SH - 5STE, has 1st FL Master bed/bathroom set up (stall w/ bench)        Prior Level of Function    Flowsheet Row Most Recent Value   Dominant Hand right   Ambulation independent   Transferring independent   Toileting independent   Bathing independent   Dressing independent   Eating independent   Communication understands/communicates without difficulty   Prior Level of Function Comment reports being Independent w/all fxn'l/ADL tasks w/o AD -  PTA   Assistive Device Currently Used at Home other (see comments)  [shower bench]        Occupational Profile    Flowsheet Row Most Recent Value   Reason for Services/Referral  S/p L aTSA   Environmental Supports and Barriers Supportive spouse           OT Evaluation and Treatment - 09/22/22 1112        OT Time Calculation    Start Time 1112     Stop Time 1135     Time Calculation (min) 23 min        Session Details    Document Type daily treatment/progress note     Mode of Treatment occupational therapy        General Information    Patient Profile Reviewed yes     Onset of Illness/Injury or Date of Surgery 09/21/22     Referring Physician Philippe     General Observations of Patient Pt rec'd sitting in recliner, agreeable to therapy     Existing Precautions/Restrictions fall;weight bearing;shoulder;range of motion        Weight-bearing Status    Left UE Weight-Bearing Status non weight-bearing (NWB)        Cognition/Psychosocial    Affect/Mental Status (Cognition) WFL     Orientation Status (Cognition) oriented x 4     Follows Commands (Cognition) WFL     Cognitive Function WFL     Comment, Cognition Pleasant and cooperative        Bed Mobility    Comment (Bed Mobility) OOB t/o        Transfers    Transfers toilet transfer        Sit to Stand Transfer    [place], Sit to Stand Transfer independent     Assistive Device none        Stand to Sit Transfer    [place], Stand to Sit Transfer independent     Assistive Device none        Toilet Transfer    Transfer Technique stand-sit;sit-stand     [place], Toilet Transfer independent     Assistive Device none        Balance    Balance Assessment sitting static balance;sitting dynamic balance;sit to stand dynamic balance;standing static balance;standing dynamic balance     Static Sitting Balance WFL     Dynamic Sitting Balance WFL     Sit to Stand Dynamic Balance WFL     Static Standing Balance WFL     Dynamic Standing Balance mild impairment        Therapeutic Exercise    Therapeutic Exercise upper extremity        Upper Extremity (Therapeutic Exercise)    Comment Pt reported PT provided handout for 40/140 exercises.        Upper  Body Dressing    Tasks don;front-opening garment   +brace    [place] modified independence     Position supported sitting        Lower Body Dressing    Tasks don;underwear;pants/bottoms;shoes/slippers;socks     [place] modified independence     Position supported sitting;unsupported standing        AM-PAC (TM) - ADL (Current Function)    Putting on and taking off regular lower body clothing? 4 - None     Bathing? 3 - A Little     Toileting? 4 - None     Putting on/taking off regular upper body clothing? 3 - A Little     How much help for taking care of personal grooming? 4 - None     Eating meals? 4 - None     AM-PAC (TM) ADL Score 22        Assessment/Plan (OT)    Daily Outcome Statement Pt seen for OT treatment session. Pt ind with ADLs and mobility. Cleared by OT for d/c home with assist PRN.               OT Assessment/Plan    Flowsheet Row Most Recent Value   OT Recommended Discharge Disposition home with assistance at 09/21/2022 1442   Anticipated Equipment Needs At Discharge (OT) none at 09/21/2022 1442   Patient/Family Therapy Goal Statement to go home at 09/21/2022 1442                         OT Goals    Flowsheet Row Most Recent Value   Transfer Goal 1    Activity/Assistive Device toilet at 09/21/2022 1442   [place] modified independence at 09/21/2022 1442   Time Frame by discharge at 09/21/2022 1442   Progress/Outcome goal met at 09/22/2022 1112   Dressing Goal 1    Activity/Adaptive Equipment dressing skills, all at 09/21/2022 1442   [place] minimum assist (75% or more patient effort) at 09/21/2022 1442   Time Frame by discharge at 09/21/2022 1442   Progress/Outcome goal met at 09/22/2022 1112   Toileting Goal 1    Activity/Assistive Device toileting skills, all at 09/21/2022 1442   [place] modified independence at 09/21/2022 1442   Time Frame by discharge at 09/21/2022 1442   Progress/Outcome goal met at 09/22/2022 1112

## 2022-09-22 NOTE — PLAN OF CARE
Plan of Care Review  Plan of Care Reviewed With: patient  Progress: improving  Outcome Summary: Pt up ad kayden, independent, ambulated in hallway, sitting up in chair. Reports pain control with current regimen. Possible discharge home this afternoon.

## 2022-09-22 NOTE — PLAN OF CARE
Problem: Adult Inpatient Plan of Care  Goal: Readiness for Transition of Care  Outcome: Met  Intervention: Mutually Develop Transition Plan  Flowsheets (Taken 9/22/2022 1201)  Anticipated Discharge Disposition: home with assistance  Equipment Needed After Discharge: none  Assistive Device/Animal Currently Used at Home: (shower bench) other (see comments)  Anticipated Changes Related to Illness: none  Transportation Concerns: car, none  Readmission Within the Last 30 Days: no previous admission in last 30 days  Patient/Family Anticipated Services at Transition: none  Patient/Family Anticipates Transition to: home with family  Transportation Anticipated: family or friend will provide  Concerns to be Addressed: no discharge needs identified  Offered/Gave Vendor List: no     Pt s/p L aTSA on 9/21/22  by . SW met with pt at the bedside this morning.Pt reported she lives with her  in a 1SH with 5STE. Pt has a stall shower with shower bench. Pt was independent with ADLs PTA. Pt has no AD, O2, AD/LW, HC, or SNF/ARH stays. PCP and Pharmacy confirmed. IMM completed. Pt's  will transport home. No other d/c needs identified.    Discharge Plan  Home   will transport

## 2022-09-22 NOTE — PATIENT CARE CONFERENCE
Care Progression Rounds Note  Date: 9/22/2022  Time: 10:28 AM     Patient Name: Radha Chiang     Medical Record Number: [id number]   YOB: 1950  Sex: Female      Room/Bed: 5402    Admitting Diagnosis: Arthritis of left shoulder region [M19.012]  S/P shoulder replacement, left [Z96.612]   Admit Date/Time: 9/21/2022  6:24 AM    Primary Diagnosis: S/P shoulder surgery  Principal Problem: S/P shoulder surgery    GMLOS: pending  Anticipated Discharge Date: 9/22/2022    AM-PAC:  Mobility Score: 24    Discharge Planning:  Current Living Arrangements: home  Anticipated Discharge Disposition: home with assistance    Barriers to Discharge:  None    Comments:  d/c home today    Participants:  advanced practice provider, , physical therapy, nursing, social work/services

## 2022-09-22 NOTE — PLAN OF CARE
Plan of Care Review  Plan of Care Reviewed With: patient  Progress: improving  Outcome Summary: Patient assists x1 OOB to bathroom and walks in the hallway. Tylenol ATC and PRN Oxy control pain. NSS runs 80 ml/hr. Ancef maintains. PT/OT will work with her again today. Start Xarelto for dvt ppx today and plan to d/c to home when medically cleared.

## 2022-09-23 NOTE — UM PHYSICIAN REVIEW NOTE
Post discharge review.    Outpatient services are appropriate for 1 MN stay for shoulder surgery with CPT codes not on IPO list.    Payton Markham DO

## 2022-09-23 NOTE — UM PHYSICIAN REVIEW NOTE
Utilization Secondary Review Note      Patient Name: Radha Chiang      MRN: [id number]  Insurance: MEDICARE  Admission date: 9/21/2022  Initial order:   Inpatient   Planned admission: Yes  Post Discharge Review: Yes            Outpatient services are appropriate for this 72 y.o.  female admitted for scheduled Anatomic total shoulder arthroplasty with biceps tenodesis and mild asymmetric reaming. Patient required < 2 MN of hLOC.       Rita Stroud DO  9/23/2022

## 2022-11-01 ENCOUNTER — ANNUAL EXAM (OUTPATIENT)
Dept: OBGYN CLINIC | Facility: CLINIC | Age: 72
End: 2022-11-01

## 2022-11-01 VITALS
BODY MASS INDEX: 26.05 KG/M2 | DIASTOLIC BLOOD PRESSURE: 60 MMHG | WEIGHT: 152.6 LBS | SYSTOLIC BLOOD PRESSURE: 124 MMHG | HEIGHT: 64 IN

## 2022-11-01 DIAGNOSIS — Z01.419 ENCOUNTER FOR GYNECOLOGICAL EXAMINATION (GENERAL) (ROUTINE) WITHOUT ABNORMAL FINDINGS: Primary | ICD-10-CM

## 2022-11-01 DIAGNOSIS — Z12.31 ENCOUNTER FOR SCREENING MAMMOGRAM FOR MALIGNANT NEOPLASM OF BREAST: ICD-10-CM

## 2022-11-01 DIAGNOSIS — Z12.4 SCREENING FOR MALIGNANT NEOPLASM OF THE CERVIX: ICD-10-CM

## 2022-11-01 RX ORDER — EZETIMIBE 10 MG/1
10 TABLET ORAL DAILY
COMMUNITY
Start: 2022-09-16

## 2022-11-01 RX ORDER — PANTOPRAZOLE SODIUM 40 MG/1
40 TABLET, DELAYED RELEASE ORAL DAILY
COMMUNITY

## 2022-11-01 RX ORDER — RIVAROXABAN 10 MG/1
TABLET, FILM COATED ORAL
COMMUNITY
Start: 2022-09-13

## 2022-11-01 RX ORDER — ATORVASTATIN CALCIUM 40 MG/1
TABLET, FILM COATED ORAL
COMMUNITY
Start: 2022-10-16

## 2022-11-01 RX ORDER — ESCITALOPRAM OXALATE 20 MG/1
20 TABLET ORAL DAILY
COMMUNITY
Start: 2022-08-26

## 2022-11-01 RX ORDER — ASPIRIN 81 MG/1
81 TABLET ORAL DAILY
COMMUNITY
Start: 2022-09-22

## 2022-11-01 RX ORDER — LORAZEPAM 0.5 MG/1
TABLET ORAL
COMMUNITY
Start: 2022-09-16

## 2022-11-01 RX ORDER — DILTIAZEM HYDROCHLORIDE 120 MG/1
CAPSULE, COATED, EXTENDED RELEASE ORAL
COMMUNITY
Start: 2022-10-27

## 2022-11-01 RX ORDER — IRBESARTAN 150 MG/1
150 TABLET ORAL DAILY
COMMUNITY
Start: 2022-08-18

## 2022-11-01 NOTE — ASSESSMENT & PLAN NOTE
Here for Medicare biannual exam    No breast or pelvic concerns     Normal breast and pelvic exams, pap done per request    Mammo order given  Dexa with Dr Apollo Mccormick  Colonoscopy 2022, due 2027

## 2022-11-01 NOTE — LETTER
November 1, 2022     Pinky Mcgill MD  [address] [facility] [address]    Patient: Leah Marcos   YOB: 1950   Date of Visit: 11/1/2022       Dear Dr Rudi Traore: Thank you for referring Leah Marcos to me for evaluation  Below are my notes for this consultation  If you have questions, please do not hesitate to call me  I look forward to following your patient along with you  Sincerely,        Shukri Norton MD        CC: No Recipients  Shukri Norton MD  11/1/2022  1:27 PM  Sign when Signing Visit  Assessment/Plan:    Encounter for gynecological examination (general) (routine) without abnormal findings  Here for Medicare biannual exam    No breast or pelvic concerns  Normal breast and pelvic exams, pap done per request    Mammo order given  Dexa with Dr Rudi Traore  Colonoscopy 2022, due 2027       Diagnoses and all orders for this visit:    Encounter for gynecological examination (general) (routine) without abnormal findings    Encounter for screening mammogram for malignant neoplasm of breast  -     Mammo screening bilateral w 3d & cad; Future    Screening for malignant neoplasm of the cervix  -     Thinprep Tis Pap Reflex HPV mRNA E6/E7    Other orders  -     aspirin (ECOTRIN LOW STRENGTH) 81 mg EC tablet; Take 81 mg by mouth daily  -     atorvastatin (LIPITOR) 40 mg tablet; TAKE 1 TABLET BY MOUTH DAILY IN THE EVENING AS DIRECTED  -     Biotin 5 MG TBDP; daily  -     Cholecalciferol 10 MCG (400 UNIT) CAPS; Take by mouth  -     diltiazem (CARDIZEM CD) 120 mg 24 hr capsule  -     escitalopram (LEXAPRO) 20 mg tablet; Take 20 mg by mouth daily  -     ezetimibe (ZETIA) 10 mg tablet; Take 10 mg by mouth daily  -     irbesartan (AVAPRO) 150 mg tablet; Take 150 mg by mouth daily  -     LORazepam (ATIVAN) 0 5 mg tablet; TAKE 1 TABLET BY MOUTH TWICE A DAY AS NEEDED FOR 30 DAYS  -     pantoprazole (PROTONIX) 40 mg tablet;  Take 40 mg by mouth daily  -     Xarelto 10 MG tablet; TAKE 1 TABLET BY MOUTH DAILY FOR 4 WEEKS AFTER SURGERY          Subjective:      Patient ID: Jarod Awan is a 71 y o  female  HPI  Here for Medicare biannual breast and pelvic exams  The following portions of the patient's history were reviewed and updated as appropriate:   She  has a past medical history of Arrhythmia, Cancer of salivary gland ([facility] [address] ), Chronic back pain, Colon polyps, Factor V Leiden ([facility] [address] ), IBS (irritable bowel syndrome), Irritable bowel syndrome, Osteoporosis, and Squamous cell cancer of skin of scalp ()  She   Patient Active Problem List    Diagnosis Date Noted   • Encounter for gynecological examination (general) (routine) without abnormal findings 2022     She  has a past surgical history that includes  section;  section; Shoulder arthroscopy; Tonsillectomy; Tubal ligation; Colonoscopy (2022); DXA procedure(historical) (); Salivary gland surgery (); Total shoulder replacement (Left, 10/2022); and Cardiac Loop Recorder ()  Her family history includes Breast cancer in her cousin; Colon cancer in her mother; Factor V Leiden deficiency in her daughter; Osteoporosis in her mother  She  reports that she has quit smoking  She has never used smokeless tobacco  She reports current alcohol use of about 5 0 standard drinks of alcohol per week  She reports previous drug use    Current Outpatient Medications   Medication Sig Dispense Refill   • aspirin (ECOTRIN LOW STRENGTH) 81 mg EC tablet Take 81 mg by mouth daily     • atorvastatin (LIPITOR) 40 mg tablet TAKE 1 TABLET BY MOUTH DAILY IN THE EVENING AS DIRECTED     • Biotin 5 MG TBDP daily     • Cholecalciferol 10 MCG (400 UNIT) CAPS Take by mouth     • diltiazem (CARDIZEM CD) 120 mg 24 hr capsule      • escitalopram (LEXAPRO) 20 mg tablet Take 20 mg by mouth daily     • ezetimibe (ZETIA) 10 mg tablet Take 10 mg by mouth daily     • irbesartan (AVAPRO) 150 mg tablet Take 150 mg by mouth daily     • LORazepam (ATIVAN) 0 5 mg tablet TAKE 1 TABLET BY MOUTH TWICE A DAY AS NEEDED FOR 30 DAYS     • pantoprazole (PROTONIX) 40 mg tablet Take 40 mg by mouth daily     • Xarelto 10 MG tablet TAKE 1 TABLET BY MOUTH DAILY FOR 4 WEEKS AFTER SURGERY       No current facility-administered medications for this visit  She is allergic to black walnut pollen allergy skin test - food allergy, cat hair extract, dust mite extract, kiwi extract - food allergy, molds & smuts, sulfa antibiotics, and sulfamethoxazole-trimethoprim       Review of Systems  No PMB, breast, bladder, bowel changes   No new persistent pain, bloating, early satiety or pelvic pressure      Objective:      /60 (BP Location: Left arm, Patient Position: Sitting, Cuff Size: Standard)   Ht 5' 4" (1 626 m)   Wt 69 2 kg (152 lb 9 6 oz)   BMI 26 19 kg/m²          Physical Exam    General appearance: no distress, pleasant  Neck: thyroid without nodules or thyromegaly, no palpable adenopathy  Lymph nodes: no palpable adenopathy  Breasts: no masses, nodes or skin changes  Abdomen: soft, non tender, no palpable masses  Pelvic exam: normal atrophic external genitalia, urethral meatus normal, **pedes spec**vagina atrophic without lesions, cervix atrophic without lesions, uterus small, non tender, no adnexal masses, non tender  Rectal exam: normal sphincter tone, no masses, RV confirms above

## 2022-11-01 NOTE — PATIENT INSTRUCTIONS
Return to office in two years unless having any problems such as breast changes, bleeding, new persistent pain, new progressive bloating, new problems eating (getting full to quickly) or new constant urinary pressure that does not resolve in one week  Call in May 2024 to schedule the Nov 2024 visit

## 2022-11-04 LAB
CLINICAL INFO: NORMAL
CYTO CVX: NORMAL
CYTOLOGY CMNT CVX/VAG CYTO-IMP: NORMAL
DATE PREVIOUS BX: NORMAL
LMP START DATE: NORMAL
SL AMB PREV. PAP:: NORMAL
SPECIMEN SOURCE CVX/VAG CYTO: NORMAL
STAT OF ADQ CVX/VAG CYTO-IMP: NORMAL

## 2024-02-21 PROBLEM — Z01.419 ENCOUNTER FOR GYNECOLOGICAL EXAMINATION (GENERAL) (ROUTINE) WITHOUT ABNORMAL FINDINGS: Status: RESOLVED | Noted: 2022-11-01 | Resolved: 2024-02-21

## 2024-08-13 ENCOUNTER — TELEPHONE (OUTPATIENT)
Dept: PAIN MEDICINE | Facility: CLINIC | Age: 74
End: 2024-08-13

## 2024-08-13 NOTE — TELEPHONE ENCOUNTER
Kyung Saab. [birth year]   Needs right psoas block.  [phone]        Raji Masters D.O.  Medical Director  [facility]  [facility]  Clinical   Department of Anesthesiology  [facility]

## 2024-08-15 NOTE — TELEPHONE ENCOUNTER
PRE OP INSTRUCTIONS:     -If you are on prescription blood thinners, you may have to hold the medication for several days before the procedure.    Please call the office to discuss medication holds at [phone].  -Do not eat or drink ONE HOUR prior to your procedure. If you are diabetic, may follow regular breakfast/lunch schedule and take usual    diabetic medications.  -Lumbar( low back) procedure, please wear comfortable slacks/pants.  -Cervical (neck) procedure, please wear a shirt/blouse that is easy to remove.  -A  is required to take you home form your procedure.  -Continue all to take prescribed medication the day of your procedure, including blood pressure medications.  -If you are prescribe antibiotics, have an active infection or have an open wound, please contact the office at [phone].  -Please refrain from any vaccinations two weeks before and two weeks after injection.  -Insurance authorization received in not a guarantee of payment per your insurance company's authorization disclaimer and it is    your responsibility to verify your benefits.          -If you have any questions about the instructions, please call me at [phone]          -PATIENT INSTRUCTED TO STOP ALL NSAID'S 4 DAYS PRIOR TO PROCEDURE            EXCEPT FOR IBUPROFEN IT CAN BE TAKEN UP TO 24 HOURS PRIOR TO PROCEDURE.

## 2024-10-02 ENCOUNTER — TELEPHONE (OUTPATIENT)
Age: 74
End: 2024-10-02

## 2024-10-02 ENCOUNTER — HOSPITAL ENCOUNTER (OUTPATIENT)
Dept: RADIOLOGY | Facility: CLINIC | Age: 74
Discharge: HOME | End: 2024-10-02
Payer: MEDICARE

## 2024-10-02 VITALS
TEMPERATURE: 97.3 F | SYSTOLIC BLOOD PRESSURE: 142 MMHG | HEART RATE: 61 BPM | RESPIRATION RATE: 18 BRPM | DIASTOLIC BLOOD PRESSURE: 84 MMHG | OXYGEN SATURATION: 95 %

## 2024-10-02 DIAGNOSIS — G24.9 DYSTONIA: ICD-10-CM

## 2024-10-02 DIAGNOSIS — M79.18 MYOFASCIAL PAIN DYSFUNCTION SYNDROME: ICD-10-CM

## 2024-10-02 PROCEDURE — 77002 NEEDLE LOCALIZATION BY XRAY: CPT | Performed by: ANESTHESIOLOGY

## 2024-10-02 PROCEDURE — 20552 NJX 1/MLT TRIGGER POINT 1/2: CPT | Performed by: ANESTHESIOLOGY

## 2024-10-02 RX ORDER — BUPIVACAINE HCL/PF 2.5 MG/ML
6 VIAL (ML) INJECTION ONCE
Status: COMPLETED | OUTPATIENT
Start: 2024-10-02 | End: 2024-10-02

## 2024-10-02 RX ADMIN — BUPIVACAINE HYDROCHLORIDE 6 ML: 2.5 INJECTION, SOLUTION EPIDURAL; INFILTRATION; INTRACAUDAL at 09:15

## 2024-10-02 RX ADMIN — IOHEXOL 4 ML: 300 INJECTION, SOLUTION INTRAVENOUS at 09:14

## 2024-10-02 NOTE — DISCHARGE INSTRUCTIONS

## 2024-10-02 NOTE — TELEPHONE ENCOUNTER
Caller: Patient    Doctor/Office: maribeth    Call regarding :  appt ?     Call was transferred to: spa

## 2024-10-02 NOTE — TELEPHONE ENCOUNTER
S/w pt told her to avoid strength training for 5-7 days and listen to her body if something hurts, avoid that exercise.

## 2024-10-02 NOTE — TELEPHONE ENCOUNTER
Caller: patient    Doctor: VILMA    Reason for call: had a procedure scheduled today and would like to know if she is able to work out on Friday or if there is any restriction      Please advise  Call back#:

## 2024-10-04 NOTE — TELEPHONE ENCOUNTER
S/w pt, pt inquired about how to send back pain diary, but her  stated he will fax it.  Pt then went on to let the nurse know she did not have any relief from block on 10/2.  Pt was given bupivicaine, nurse did inform pt it was diagnostic and not long term.  Pt was refereed from Dr. Warren, nurse advised once pain diary is reviewed our office will reach out to pt.  Nurse did inform pt that if there was no relief in her symptoms that the area may not be the cause of her pain, nurse did ask pt if she had recent imaging on lumbar spine, pt denied.  Nurse did advise pt she would need to f/u with Dr. Warren for plan moving fwd but we will reach out once pain diary is reviewed. Pt asked if she could take pain medication as she was told not to for the pain diary, nurse advised pt she can resume her normal pain relieving medication as the diary was for 12 hours. Pt was appreciative of call.

## 2024-10-04 NOTE — TELEPHONE ENCOUNTER
Caller: kristina Tracey     Doctor: Sonam    Reason for call: pt would like a call back in reference to filling out her pain diary. Please Advise     Call back#: [phone]

## 2024-10-08 NOTE — TELEPHONE ENCOUNTER
Caller: kristina Tracey     Doctor: Sonam    Reason for call: pt called to see if we received her pain diary, I confirmed pain diary received and has been scanned into chart.     Call back#: [phone]

## 2024-10-16 ENCOUNTER — TELEPHONE (OUTPATIENT)
Dept: PAIN MEDICINE | Facility: CLINIC | Age: 74
End: 2024-10-16

## 2024-10-16 NOTE — TELEPHONE ENCOUNTER
Pt reports no improvement post inj   She said it changed the problem  She wants to know if you do quadratus lumborum injections?  Pain level 5/10      Right psoas block w/pain diary 10/2, No F/u

## 2024-10-16 NOTE — TELEPHONE ENCOUNTER
S/w pt and advised of the same, pt verbalized understanding and appreciative of call.     DO Chelsea Cruz MA9 minutes ago (9:44 AM)       no

## 2025-07-22 ENCOUNTER — TELEPHONE (OUTPATIENT)
Age: 75
End: 2025-07-22

## 2025-07-22 NOTE — TELEPHONE ENCOUNTER
Called and spoke to yancy. She will call us or message us through [employer] if she doesn't see an improvement with her BP.    This was assisted by Lois LANE

## 2025-07-22 NOTE — TELEPHONE ENCOUNTER
Kyung called, she is concerned with her low BP  101/60.    The last few days she had a stomach virus with lots of diarrhea. So she knows that dehydration can cause this.    She also wants to note she had 3 injections of tirzepatide. And she notes that can also cause low BP.    She will take electrolytes to help with this.     She did stand up and get lightheaded and that's when she checked her BP and found it to be low. She is taking avapro 150mg daily (at night). Diltiazem 120mg capsule daily (morning)    She wants to know if she should alter any BP meds or do something to help with the low BP.    She did already take her diltiazem this morning and avapro was last night. [place] [facility] [place]

## 2025-08-06 DIAGNOSIS — I10 ESSENTIAL (PRIMARY) HYPERTENSION: ICD-10-CM

## 2025-08-06 RX ORDER — IRBESARTAN 150 MG/1
75 TABLET ORAL DAILY
Qty: 90 TABLET | Refills: 3 | Status: SHIPPED | OUTPATIENT
Start: 2025-08-06 | End: 2025-11-04

## 2025-08-08 ENCOUNTER — TELEPHONE (OUTPATIENT)
Age: 75
End: 2025-08-08

## 2025-08-14 ENCOUNTER — REMOTE DEVICE CLINIC VISIT (OUTPATIENT)
Dept: CARDIOLOGY CLINIC | Facility: CLINIC | Age: 75
End: 2025-08-14
Payer: MEDICARE

## (undated) DEVICE — SUTURE ETHIBOND 5 MB47G CCS 4PK

## (undated) DEVICE — TIP SUCTION FRAZIER 12FR

## (undated) DEVICE — ***USE 57698*** SLEEVE FLOWTRON DVT CALF SINGLE USE

## (undated) DEVICE — ***USE 143206***SYRINGE BULB

## (undated) DEVICE — SUTURETAPE 1.3MM SUTURE W NEEDLE

## (undated) DEVICE — APPLICATOR CHLORAPREP 26ML ORANGE TINT

## (undated) DEVICE — COVER BACK TABLE REINFORCED

## (undated) DEVICE — SUTURE VICRYL PLUS 2-0 VCP869H

## (undated) DEVICE — MANIFOLD FOUR PORT NEPTUNE

## (undated) DEVICE — FACEMASK FOR SHOULDER POSITONER

## (undated) DEVICE — GLOVE SZ 8 PROTEXIS PI

## (undated) DEVICE — MIX-EVAC HIGH VACUUM KIT

## (undated) DEVICE — SLING SHOULDER MEDIUM

## (undated) DEVICE — HOOD T7 PLUS

## (undated) DEVICE — DRAPE POUCH IRRIGATION

## (undated) DEVICE — GUIDE WIRE

## (undated) DEVICE — GLOVE SZ 8 LINER PROTEXIS PI BL

## (undated) DEVICE — SURGICEL 2X14

## (undated) DEVICE — PAD GROUND ELECTROSURGICAL W/CORD

## (undated) DEVICE — VEST STERILE

## (undated) DEVICE — DRAPE 3/4 REINFORCED

## (undated) DEVICE — BLADE SCALPEL #15

## (undated) DEVICE — COVER MAYO STAND

## (undated) DEVICE — DRAPE LARGE REVERSE FOLD

## (undated) DEVICE — PACK MLHS SHOULDER PACK RFID STDZ

## (undated) DEVICE — ADHESIVE SKIN DERMABOND ADVANCED 0.7ML

## (undated) DEVICE — BLANKET UPPER BODY BAIR HUGGER

## (undated) DEVICE — SUTURE MONOCRYL 3-0  Y497G

## (undated) DEVICE — GLOVE SZ 8.5 PROTEXIS PI

## (undated) DEVICE — HEMOSTAT SURGICEL ABSORBABLE 4 X 8

## (undated) DEVICE — DRAPE-U-1015

## (undated) DEVICE — MAT ABSORBANT SUPERMAT 50 X 56

## (undated) DEVICE — BLADE SAGITTAL #376 HEAVY WIDE

## (undated) DEVICE — SOLN IRRIG STER WATER 1000ML

## (undated) DEVICE — GAUZE 8X4 16 PLY RFID DOUBLE XRAY

## (undated) DEVICE — NEEDLE HALF CIRCLE TAPER MED GALLES FASCIA

## (undated) DEVICE — Device

## (undated) DEVICE — TIP SUCTION YANKAUER

## (undated) DEVICE — DRAPE IOBAN

## (undated) DEVICE — PIN DRILL 3.2MM CS/EDGE

## (undated) DEVICE — GOWN SURGICAL REINFORCED X-LAR

## (undated) DEVICE — SET HANDPIECE INTERPULSE

## (undated) DEVICE — SPONGE LAP 18X18 SAFE-T RFID ENHANCED XRAY

## (undated) DEVICE — DRESSING TEGADERM 4X4 3/4

## (undated) DEVICE — TUBING SMOKE EVAC PENCIL COATED

## (undated) DEVICE — ***USE 124736***SYRINGE TOOMEY

## (undated) DEVICE — COVER LIGHTHANDLE

## (undated) DEVICE — HEEL  ELBOW PROTECTOR